# Patient Record
Sex: MALE | Race: WHITE | Employment: OTHER | URBAN - METROPOLITAN AREA
[De-identification: names, ages, dates, MRNs, and addresses within clinical notes are randomized per-mention and may not be internally consistent; named-entity substitution may affect disease eponyms.]

---

## 2017-09-12 ENCOUNTER — HOSPITAL ENCOUNTER (OUTPATIENT)
Dept: CT IMAGING | Age: 72
Discharge: HOME | End: 2017-09-12
Attending: SURGERY
Payer: MEDICARE

## 2017-09-12 DIAGNOSIS — I71.40 AAA (ABDOMINAL AORTIC ANEURYSM): ICD-10-CM

## 2017-09-12 LAB — CREAT BLD-MCNC: 1 MG/DL (ref 0.6–1.3)

## 2017-09-12 PROCEDURE — 74011250636 HC RX REV CODE- 250/636: Performed by: SURGERY

## 2017-09-12 PROCEDURE — 71275 CT ANGIOGRAPHY CHEST: CPT

## 2017-09-12 PROCEDURE — 74011636320 HC RX REV CODE- 636/320: Performed by: SURGERY

## 2017-09-12 PROCEDURE — 74174 CTA ABD&PLVS W/CONTRAST: CPT

## 2017-09-12 PROCEDURE — 82565 ASSAY OF CREATININE: CPT

## 2017-09-12 RX ORDER — SODIUM CHLORIDE 9 MG/ML
50 INJECTION, SOLUTION INTRAVENOUS
Status: COMPLETED | OUTPATIENT
Start: 2017-09-12 | End: 2017-09-12

## 2017-09-12 RX ORDER — SODIUM CHLORIDE 0.9 % (FLUSH) 0.9 %
10 SYRINGE (ML) INJECTION
Status: COMPLETED | OUTPATIENT
Start: 2017-09-12 | End: 2017-09-12

## 2017-09-12 RX ADMIN — IOPAMIDOL 100 ML: 755 INJECTION, SOLUTION INTRAVENOUS at 06:57

## 2017-09-12 RX ADMIN — Medication 10 ML: at 06:57

## 2017-09-12 RX ADMIN — SODIUM CHLORIDE 50 ML/HR: 900 INJECTION, SOLUTION INTRAVENOUS at 06:57

## 2017-09-25 ENCOUNTER — HOSPITAL ENCOUNTER (OUTPATIENT)
Dept: GENERAL RADIOLOGY | Age: 72
Discharge: HOME | End: 2017-09-25
Attending: SURGERY
Payer: MEDICARE

## 2017-09-25 ENCOUNTER — HOSPITAL ENCOUNTER (OUTPATIENT)
Dept: PREADMISSION TESTING | Age: 72
Discharge: HOME | End: 2017-09-25
Attending: SURGERY
Payer: MEDICARE

## 2017-09-25 VITALS
SYSTOLIC BLOOD PRESSURE: 126 MMHG | DIASTOLIC BLOOD PRESSURE: 72 MMHG | HEIGHT: 72 IN | WEIGHT: 171.3 LBS | TEMPERATURE: 98.5 F | BODY MASS INDEX: 23.2 KG/M2 | RESPIRATION RATE: 18 BRPM | OXYGEN SATURATION: 100 % | HEART RATE: 60 BPM

## 2017-09-25 LAB
ABO + RH BLD: NORMAL
ALBUMIN SERPL-MCNC: 3.5 G/DL (ref 3.5–5)
ALBUMIN/GLOB SERPL: 0.9 {RATIO} (ref 1.1–2.2)
ALP SERPL-CCNC: 69 U/L (ref 45–117)
ALT SERPL-CCNC: 19 U/L (ref 12–78)
ANION GAP SERPL CALC-SCNC: 7 MMOL/L (ref 5–15)
APPEARANCE UR: CLEAR
APTT PPP: 27.6 SEC (ref 22.1–32.5)
AST SERPL-CCNC: 9 U/L (ref 15–37)
ATRIAL RATE: 58 BPM
BACTERIA URNS QL MICRO: NEGATIVE /HPF
BILIRUB SERPL-MCNC: 0.4 MG/DL (ref 0.2–1)
BILIRUB UR QL: NEGATIVE
BLOOD GROUP ANTIBODIES SERPL: NORMAL
BUN SERPL-MCNC: 20 MG/DL (ref 6–20)
BUN/CREAT SERPL: 17 (ref 12–20)
CALCIUM SERPL-MCNC: 8.9 MG/DL (ref 8.5–10.1)
CALCULATED P AXIS, ECG09: 72 DEGREES
CALCULATED R AXIS, ECG10: 82 DEGREES
CALCULATED T AXIS, ECG11: 75 DEGREES
CHLORIDE SERPL-SCNC: 104 MMOL/L (ref 97–108)
CO2 SERPL-SCNC: 28 MMOL/L (ref 21–32)
COLOR UR: ABNORMAL
CREAT SERPL-MCNC: 1.16 MG/DL (ref 0.7–1.3)
DIAGNOSIS, 93000: NORMAL
EPITH CASTS URNS QL MICRO: ABNORMAL /LPF
ERYTHROCYTE [DISTWIDTH] IN BLOOD BY AUTOMATED COUNT: 14.4 % (ref 11.5–14.5)
GLOBULIN SER CALC-MCNC: 3.8 G/DL (ref 2–4)
GLUCOSE SERPL-MCNC: 81 MG/DL (ref 65–100)
GLUCOSE UR STRIP.AUTO-MCNC: NEGATIVE MG/DL
HCT VFR BLD AUTO: 44.3 % (ref 36.6–50.3)
HGB BLD-MCNC: 15.4 G/DL (ref 12.1–17)
HGB UR QL STRIP: ABNORMAL
HYALINE CASTS URNS QL MICRO: ABNORMAL /LPF (ref 0–5)
INR PPP: 1 (ref 0.9–1.1)
KETONES UR QL STRIP.AUTO: NEGATIVE MG/DL
LEUKOCYTE ESTERASE UR QL STRIP.AUTO: ABNORMAL
MCH RBC QN AUTO: 30.9 PG (ref 26–34)
MCHC RBC AUTO-ENTMCNC: 34.8 G/DL (ref 30–36.5)
MCV RBC AUTO: 88.8 FL (ref 80–99)
NITRITE UR QL STRIP.AUTO: NEGATIVE
P-R INTERVAL, ECG05: 178 MS
PH UR STRIP: 6 [PH] (ref 5–8)
PLATELET # BLD AUTO: 216 K/UL (ref 150–400)
POTASSIUM SERPL-SCNC: 4.3 MMOL/L (ref 3.5–5.1)
PROT SERPL-MCNC: 7.3 G/DL (ref 6.4–8.2)
PROT UR STRIP-MCNC: 30 MG/DL
PROTHROMBIN TIME: 10.1 SEC (ref 9–11.1)
Q-T INTERVAL, ECG07: 396 MS
QRS DURATION, ECG06: 96 MS
QTC CALCULATION (BEZET), ECG08: 388 MS
RBC # BLD AUTO: 4.99 M/UL (ref 4.1–5.7)
RBC #/AREA URNS HPF: ABNORMAL /HPF (ref 0–5)
SODIUM SERPL-SCNC: 139 MMOL/L (ref 136–145)
SP GR UR REFRACTOMETRY: 1.02 (ref 1–1.03)
SPECIMEN EXP DATE BLD: NORMAL
THERAPEUTIC RANGE,PTTT: NORMAL SECS (ref 58–77)
UA: UC IF INDICATED,UAUC: ABNORMAL
UROBILINOGEN UR QL STRIP.AUTO: 0.2 EU/DL (ref 0.2–1)
VENTRICULAR RATE, ECG03: 58 BPM
WBC # BLD AUTO: 11.9 K/UL (ref 4.1–11.1)
WBC URNS QL MICRO: ABNORMAL /HPF (ref 0–4)

## 2017-09-25 PROCEDURE — 87086 URINE CULTURE/COLONY COUNT: CPT | Performed by: SURGERY

## 2017-09-25 PROCEDURE — 85730 THROMBOPLASTIN TIME PARTIAL: CPT | Performed by: SURGERY

## 2017-09-25 PROCEDURE — 81001 URINALYSIS AUTO W/SCOPE: CPT | Performed by: SURGERY

## 2017-09-25 PROCEDURE — 71020 XR CHEST PA LAT: CPT

## 2017-09-25 PROCEDURE — 80053 COMPREHEN METABOLIC PANEL: CPT | Performed by: SURGERY

## 2017-09-25 PROCEDURE — 93005 ELECTROCARDIOGRAM TRACING: CPT

## 2017-09-25 PROCEDURE — 85027 COMPLETE CBC AUTOMATED: CPT | Performed by: SURGERY

## 2017-09-25 PROCEDURE — 86900 BLOOD TYPING SEROLOGIC ABO: CPT | Performed by: SURGERY

## 2017-09-25 PROCEDURE — 85610 PROTHROMBIN TIME: CPT | Performed by: SURGERY

## 2017-09-25 RX ORDER — GUAIFENESIN 100 MG/5ML
81 LIQUID (ML) ORAL DAILY
COMMUNITY

## 2017-09-25 RX ORDER — LISINOPRIL 20 MG/1
20 TABLET ORAL DAILY
COMMUNITY

## 2017-09-25 RX ORDER — CLOPIDOGREL BISULFATE 75 MG/1
75 TABLET ORAL
COMMUNITY

## 2017-09-25 RX ORDER — GLUCOSAMINE SULFATE 1500 MG
1000 POWDER IN PACKET (EA) ORAL DAILY
COMMUNITY

## 2017-09-26 LAB
BACTERIA SPEC CULT: NORMAL
CC UR VC: NORMAL
SERVICE CMNT-IMP: NORMAL

## 2017-09-29 ENCOUNTER — ANESTHESIA (OUTPATIENT)
Dept: SURGERY | Age: 72
DRG: 269 | End: 2017-09-29
Payer: MEDICARE

## 2017-09-29 ENCOUNTER — HOSPITAL ENCOUNTER (INPATIENT)
Age: 72
LOS: 2 days | Discharge: HOME | DRG: 269 | End: 2017-10-01
Attending: SURGERY | Admitting: SURGERY
Payer: MEDICARE

## 2017-09-29 ENCOUNTER — ANESTHESIA EVENT (OUTPATIENT)
Dept: SURGERY | Age: 72
DRG: 269 | End: 2017-09-29
Payer: MEDICARE

## 2017-09-29 ENCOUNTER — APPOINTMENT (OUTPATIENT)
Dept: GENERAL RADIOLOGY | Age: 72
DRG: 269 | End: 2017-09-29
Attending: SURGERY
Payer: MEDICARE

## 2017-09-29 PROBLEM — I71.40 AAA (ABDOMINAL AORTIC ANEURYSM) WITHOUT RUPTURE: Status: ACTIVE | Noted: 2017-09-29

## 2017-09-29 PROCEDURE — 74011000258 HC RX REV CODE- 258: Performed by: SURGERY

## 2017-09-29 PROCEDURE — 77030034848: Performed by: SURGERY

## 2017-09-29 PROCEDURE — 74011000272 HC RX REV CODE- 272: Performed by: SURGERY

## 2017-09-29 PROCEDURE — 76001 XR FLUOROSCOPY OVER 60 MINUTES: CPT

## 2017-09-29 PROCEDURE — 74011636320 HC RX REV CODE- 636/320: Performed by: SURGERY

## 2017-09-29 PROCEDURE — 74011250636 HC RX REV CODE- 250/636: Performed by: SURGERY

## 2017-09-29 PROCEDURE — 77030008684 HC TU ET CUF COVD -B: Performed by: NURSE ANESTHETIST, CERTIFIED REGISTERED

## 2017-09-29 PROCEDURE — 76010000133 HC OR TIME 3 TO 3.5 HR: Performed by: SURGERY

## 2017-09-29 PROCEDURE — 74011250636 HC RX REV CODE- 250/636

## 2017-09-29 PROCEDURE — 77030032490 HC SLV COMPR SCD KNE COVD -B

## 2017-09-29 PROCEDURE — 77030026438 HC STYL ET INTUB CARD -A: Performed by: NURSE ANESTHETIST, CERTIFIED REGISTERED

## 2017-09-29 PROCEDURE — 76210000017 HC OR PH I REC 1.5 TO 2 HR: Performed by: SURGERY

## 2017-09-29 PROCEDURE — 74011250637 HC RX REV CODE- 250/637

## 2017-09-29 PROCEDURE — 77030036834 HC APPL HELI-FX W ENDOANCHR CASS MEDT -J: Performed by: SURGERY

## 2017-09-29 PROCEDURE — 77030002986 HC SUT PROL J&J -A: Performed by: SURGERY

## 2017-09-29 PROCEDURE — 04V03D6 RESTRICT ABD AORTA, BIFURC, W INTRALUM DEV, PERC: ICD-10-PCS | Performed by: SURGERY

## 2017-09-29 PROCEDURE — C1894 INTRO/SHEATH, NON-LASER: HCPCS | Performed by: SURGERY

## 2017-09-29 PROCEDURE — 77030020061 HC IV BLD WRMR ADMIN SET 3M -B: Performed by: NURSE ANESTHETIST, CERTIFIED REGISTERED

## 2017-09-29 PROCEDURE — 65610000006 HC RM INTENSIVE CARE

## 2017-09-29 PROCEDURE — C1757 CATH, THROMBECTOMY/EMBOLECT: HCPCS | Performed by: SURGERY

## 2017-09-29 PROCEDURE — 76010000173 HC OR TIME 3 TO 3.5 HR INTENSV-TIER 1: Performed by: SURGERY

## 2017-09-29 PROCEDURE — 72190 X-RAY EXAM OF PELVIS: CPT

## 2017-09-29 PROCEDURE — C1753 CATH, INTRAVAS ULTRASOUND: HCPCS | Performed by: SURGERY

## 2017-09-29 PROCEDURE — C1769 GUIDE WIRE: HCPCS | Performed by: SURGERY

## 2017-09-29 PROCEDURE — 74011000250 HC RX REV CODE- 250: Performed by: SURGERY

## 2017-09-29 PROCEDURE — C1887 CATHETER, GUIDING: HCPCS | Performed by: SURGERY

## 2017-09-29 PROCEDURE — 77030011640 HC PAD GRND REM COVD -A: Performed by: SURGERY

## 2017-09-29 PROCEDURE — 74011250636 HC RX REV CODE- 250/636: Performed by: ANESTHESIOLOGY

## 2017-09-29 PROCEDURE — 74011000250 HC RX REV CODE- 250

## 2017-09-29 PROCEDURE — 76060000037 HC ANESTHESIA 3 TO 3.5 HR: Performed by: SURGERY

## 2017-09-29 PROCEDURE — 77030036835 HC GD AAA REP HELI-FX MEDT -H: Performed by: SURGERY

## 2017-09-29 PROCEDURE — C1768 GRAFT, VASCULAR: HCPCS | Performed by: SURGERY

## 2017-09-29 PROCEDURE — B4101ZZ FLUOROSCOPY OF ABDOMINAL AORTA USING LOW OSMOLAR CONTRAST: ICD-10-PCS | Performed by: SURGERY

## 2017-09-29 PROCEDURE — 77030020788: Performed by: SURGERY

## 2017-09-29 PROCEDURE — 77030028837 HC SYR ANGI PWR INJ COEU -A: Performed by: SURGERY

## 2017-09-29 PROCEDURE — 74011250637 HC RX REV CODE- 250/637: Performed by: SURGERY

## 2017-09-29 PROCEDURE — 77030008463 HC STPLR SKN PROX J&J -B: Performed by: SURGERY

## 2017-09-29 PROCEDURE — 04UD3JZ SUPPLEMENT LEFT COMMON ILIAC ARTERY WITH SYNTHETIC SUBSTITUTE, PERCUTANEOUS APPROACH: ICD-10-PCS | Performed by: SURGERY

## 2017-09-29 PROCEDURE — 04U04JZ SUPPLEMENT ABDOMINAL AORTA WITH SYNTHETIC SUBSTITUTE, PERCUTANEOUS ENDOSCOPIC APPROACH: ICD-10-PCS | Performed by: SURGERY

## 2017-09-29 PROCEDURE — 77030018846 HC SOL IRR STRL H20 ICUM -A: Performed by: SURGERY

## 2017-09-29 PROCEDURE — 04UC3JZ SUPPLEMENT RIGHT COMMON ILIAC ARTERY WITH SYNTHETIC SUBSTITUTE, PERCUTANEOUS APPROACH: ICD-10-PCS | Performed by: SURGERY

## 2017-09-29 PROCEDURE — C1725 CATH, TRANSLUMIN NON-LASER: HCPCS | Performed by: SURGERY

## 2017-09-29 PROCEDURE — B41D1ZZ FLUOROSCOPY OF AORTA AND BILATERAL LOWER EXTREMITY ARTERIES USING LOW OSMOLAR CONTRAST: ICD-10-PCS | Performed by: SURGERY

## 2017-09-29 PROCEDURE — 77030020263 HC SOL INJ SOD CL0.9% LFCR 1000ML: Performed by: SURGERY

## 2017-09-29 DEVICE — APPLIER CLP 12FR L86CM W/ ENDOANCHOR CASS FOR EVAR HELI-FX: Type: IMPLANTABLE DEVICE | Site: AORTA | Status: FUNCTIONAL

## 2017-09-29 DEVICE — GRAFT EVAR L103MM DIA28X14MM CATH 18FR BIFUR DST DSGN C DEL: Type: IMPLANTABLE DEVICE | Site: AORTA | Status: FUNCTIONAL

## 2017-09-29 DEVICE — GRAFT EVAR L124MM DIA16X13MM CATH 14FR LIMB DST DSGN C DEL: Type: IMPLANTABLE DEVICE | Site: ARTERIAL | Status: FUNCTIONAL

## 2017-09-29 RX ORDER — LIDOCAINE HYDROCHLORIDE 20 MG/ML
INJECTION, SOLUTION EPIDURAL; INFILTRATION; INTRACAUDAL; PERINEURAL AS NEEDED
Status: DISCONTINUED | OUTPATIENT
Start: 2017-09-29 | End: 2017-09-29 | Stop reason: HOSPADM

## 2017-09-29 RX ORDER — SODIUM CHLORIDE 9 MG/ML
50 INJECTION, SOLUTION INTRAVENOUS CONTINUOUS
Status: DISCONTINUED | OUTPATIENT
Start: 2017-09-29 | End: 2017-10-01

## 2017-09-29 RX ORDER — ONDANSETRON 2 MG/ML
4 INJECTION INTRAMUSCULAR; INTRAVENOUS AS NEEDED
Status: DISCONTINUED | OUTPATIENT
Start: 2017-09-29 | End: 2017-09-29 | Stop reason: HOSPADM

## 2017-09-29 RX ORDER — GLYCOPYRROLATE 0.2 MG/ML
INJECTION INTRAMUSCULAR; INTRAVENOUS AS NEEDED
Status: DISCONTINUED | OUTPATIENT
Start: 2017-09-29 | End: 2017-09-29 | Stop reason: HOSPADM

## 2017-09-29 RX ORDER — GUAIFENESIN 100 MG/5ML
81 LIQUID (ML) ORAL DAILY
Status: DISCONTINUED | OUTPATIENT
Start: 2017-09-30 | End: 2017-10-01 | Stop reason: HOSPADM

## 2017-09-29 RX ORDER — MORPHINE SULFATE 2 MG/ML
2 INJECTION, SOLUTION INTRAMUSCULAR; INTRAVENOUS
Status: DISCONTINUED | OUTPATIENT
Start: 2017-09-29 | End: 2017-10-01

## 2017-09-29 RX ORDER — SODIUM CHLORIDE 0.9 % (FLUSH) 0.9 %
SYRINGE (ML) INJECTION
Status: COMPLETED
Start: 2017-09-29 | End: 2017-09-29

## 2017-09-29 RX ORDER — SODIUM CHLORIDE, SODIUM LACTATE, POTASSIUM CHLORIDE, CALCIUM CHLORIDE 600; 310; 30; 20 MG/100ML; MG/100ML; MG/100ML; MG/100ML
100 INJECTION, SOLUTION INTRAVENOUS CONTINUOUS
Status: DISCONTINUED | OUTPATIENT
Start: 2017-09-29 | End: 2017-09-29 | Stop reason: HOSPADM

## 2017-09-29 RX ORDER — LIDOCAINE HYDROCHLORIDE 10 MG/ML
0.1 INJECTION, SOLUTION EPIDURAL; INFILTRATION; INTRACAUDAL; PERINEURAL AS NEEDED
Status: DISCONTINUED | OUTPATIENT
Start: 2017-09-29 | End: 2017-09-29 | Stop reason: HOSPADM

## 2017-09-29 RX ORDER — SUCCINYLCHOLINE CHLORIDE 20 MG/ML
INJECTION INTRAMUSCULAR; INTRAVENOUS AS NEEDED
Status: DISCONTINUED | OUTPATIENT
Start: 2017-09-29 | End: 2017-09-29 | Stop reason: HOSPADM

## 2017-09-29 RX ORDER — CLOPIDOGREL BISULFATE 75 MG/1
75 TABLET ORAL DAILY
Status: DISCONTINUED | OUTPATIENT
Start: 2017-09-30 | End: 2017-10-01 | Stop reason: HOSPADM

## 2017-09-29 RX ORDER — ALBUTEROL SULFATE 90 UG/1
AEROSOL, METERED RESPIRATORY (INHALATION) AS NEEDED
Status: DISCONTINUED | OUTPATIENT
Start: 2017-09-29 | End: 2017-09-29 | Stop reason: HOSPADM

## 2017-09-29 RX ORDER — MIDAZOLAM HYDROCHLORIDE 1 MG/ML
INJECTION, SOLUTION INTRAMUSCULAR; INTRAVENOUS AS NEEDED
Status: DISCONTINUED | OUTPATIENT
Start: 2017-09-29 | End: 2017-09-29 | Stop reason: HOSPADM

## 2017-09-29 RX ORDER — ROCURONIUM BROMIDE 10 MG/ML
INJECTION, SOLUTION INTRAVENOUS AS NEEDED
Status: DISCONTINUED | OUTPATIENT
Start: 2017-09-29 | End: 2017-09-29 | Stop reason: HOSPADM

## 2017-09-29 RX ORDER — DIPHENHYDRAMINE HYDROCHLORIDE 50 MG/ML
12.5 INJECTION, SOLUTION INTRAMUSCULAR; INTRAVENOUS AS NEEDED
Status: DISCONTINUED | OUTPATIENT
Start: 2017-09-29 | End: 2017-09-29 | Stop reason: HOSPADM

## 2017-09-29 RX ORDER — FACIAL-BODY WIPES
10 EACH TOPICAL DAILY PRN
Status: DISCONTINUED | OUTPATIENT
Start: 2017-09-29 | End: 2017-10-01 | Stop reason: HOSPADM

## 2017-09-29 RX ORDER — MORPHINE SULFATE 10 MG/ML
4 INJECTION, SOLUTION INTRAMUSCULAR; INTRAVENOUS
Status: DISCONTINUED | OUTPATIENT
Start: 2017-09-29 | End: 2017-10-01

## 2017-09-29 RX ORDER — ONDANSETRON 2 MG/ML
INJECTION INTRAMUSCULAR; INTRAVENOUS AS NEEDED
Status: DISCONTINUED | OUTPATIENT
Start: 2017-09-29 | End: 2017-09-29 | Stop reason: HOSPADM

## 2017-09-29 RX ORDER — LISINOPRIL 20 MG/1
20 TABLET ORAL DAILY
Status: DISCONTINUED | OUTPATIENT
Start: 2017-09-30 | End: 2017-09-29

## 2017-09-29 RX ORDER — HYDROMORPHONE HYDROCHLORIDE 1 MG/ML
0.5 INJECTION, SOLUTION INTRAMUSCULAR; INTRAVENOUS; SUBCUTANEOUS
Status: DISCONTINUED | OUTPATIENT
Start: 2017-09-29 | End: 2017-09-29 | Stop reason: HOSPADM

## 2017-09-29 RX ORDER — PHENYLEPHRINE HCL IN 0.9% NACL 0.4MG/10ML
SYRINGE (ML) INTRAVENOUS AS NEEDED
Status: DISCONTINUED | OUTPATIENT
Start: 2017-09-29 | End: 2017-09-29 | Stop reason: HOSPADM

## 2017-09-29 RX ORDER — FENTANYL CITRATE 50 UG/ML
INJECTION, SOLUTION INTRAMUSCULAR; INTRAVENOUS AS NEEDED
Status: DISCONTINUED | OUTPATIENT
Start: 2017-09-29 | End: 2017-09-29 | Stop reason: HOSPADM

## 2017-09-29 RX ORDER — HYDROCODONE BITARTRATE AND ACETAMINOPHEN 5; 325 MG/1; MG/1
1 TABLET ORAL AS NEEDED
Status: DISCONTINUED | OUTPATIENT
Start: 2017-09-29 | End: 2017-09-29 | Stop reason: HOSPADM

## 2017-09-29 RX ORDER — FENTANYL CITRATE 50 UG/ML
25 INJECTION, SOLUTION INTRAMUSCULAR; INTRAVENOUS
Status: DISCONTINUED | OUTPATIENT
Start: 2017-09-29 | End: 2017-09-29 | Stop reason: HOSPADM

## 2017-09-29 RX ORDER — ACETAMINOPHEN 10 MG/ML
INJECTION, SOLUTION INTRAVENOUS AS NEEDED
Status: DISCONTINUED | OUTPATIENT
Start: 2017-09-29 | End: 2017-09-29 | Stop reason: HOSPADM

## 2017-09-29 RX ORDER — MIDAZOLAM HYDROCHLORIDE 1 MG/ML
1 INJECTION, SOLUTION INTRAMUSCULAR; INTRAVENOUS AS NEEDED
Status: DISCONTINUED | OUTPATIENT
Start: 2017-09-29 | End: 2017-09-29 | Stop reason: HOSPADM

## 2017-09-29 RX ORDER — NEOSTIGMINE METHYLSULFATE 1 MG/ML
INJECTION INTRAVENOUS AS NEEDED
Status: DISCONTINUED | OUTPATIENT
Start: 2017-09-29 | End: 2017-09-29 | Stop reason: HOSPADM

## 2017-09-29 RX ORDER — FENTANYL CITRATE 50 UG/ML
50 INJECTION, SOLUTION INTRAMUSCULAR; INTRAVENOUS AS NEEDED
Status: DISCONTINUED | OUTPATIENT
Start: 2017-09-29 | End: 2017-09-29 | Stop reason: HOSPADM

## 2017-09-29 RX ORDER — OXYCODONE AND ACETAMINOPHEN 5; 325 MG/1; MG/1
1-2 TABLET ORAL
Status: DISCONTINUED | OUTPATIENT
Start: 2017-09-29 | End: 2017-10-01 | Stop reason: HOSPADM

## 2017-09-29 RX ORDER — HEPARIN SODIUM 1000 [USP'U]/ML
INJECTION, SOLUTION INTRAVENOUS; SUBCUTANEOUS AS NEEDED
Status: DISCONTINUED | OUTPATIENT
Start: 2017-09-29 | End: 2017-09-29 | Stop reason: HOSPADM

## 2017-09-29 RX ORDER — ADHESIVE BANDAGE
30 BANDAGE TOPICAL DAILY PRN
Status: DISCONTINUED | OUTPATIENT
Start: 2017-09-29 | End: 2017-10-01 | Stop reason: HOSPADM

## 2017-09-29 RX ORDER — MIDAZOLAM HYDROCHLORIDE 1 MG/ML
0.5 INJECTION, SOLUTION INTRAMUSCULAR; INTRAVENOUS
Status: DISCONTINUED | OUTPATIENT
Start: 2017-09-29 | End: 2017-09-29 | Stop reason: HOSPADM

## 2017-09-29 RX ORDER — SODIUM CHLORIDE, SODIUM LACTATE, POTASSIUM CHLORIDE, CALCIUM CHLORIDE 600; 310; 30; 20 MG/100ML; MG/100ML; MG/100ML; MG/100ML
25 INJECTION, SOLUTION INTRAVENOUS CONTINUOUS
Status: DISCONTINUED | OUTPATIENT
Start: 2017-09-29 | End: 2017-09-30

## 2017-09-29 RX ORDER — LISINOPRIL 20 MG/1
20 TABLET ORAL DAILY
Status: DISCONTINUED | OUTPATIENT
Start: 2017-09-29 | End: 2017-10-01 | Stop reason: HOSPADM

## 2017-09-29 RX ORDER — ONDANSETRON 2 MG/ML
4 INJECTION INTRAMUSCULAR; INTRAVENOUS
Status: DISCONTINUED | OUTPATIENT
Start: 2017-09-29 | End: 2017-10-01 | Stop reason: HOSPADM

## 2017-09-29 RX ORDER — PROPOFOL 10 MG/ML
INJECTION, EMULSION INTRAVENOUS AS NEEDED
Status: DISCONTINUED | OUTPATIENT
Start: 2017-09-29 | End: 2017-09-29 | Stop reason: HOSPADM

## 2017-09-29 RX ORDER — CEFAZOLIN SODIUM IN 0.9 % NACL 2 G/100 ML
2 PLASTIC BAG, INJECTION (ML) INTRAVENOUS EVERY 8 HOURS
Status: COMPLETED | OUTPATIENT
Start: 2017-09-29 | End: 2017-09-30

## 2017-09-29 RX ADMIN — Medication 3 MG/HR: at 21:21

## 2017-09-29 RX ADMIN — SODIUM CHLORIDE, SODIUM LACTATE, POTASSIUM CHLORIDE, AND CALCIUM CHLORIDE 25 ML/HR: 600; 310; 30; 20 INJECTION, SOLUTION INTRAVENOUS at 07:05

## 2017-09-29 RX ADMIN — FENTANYL CITRATE 25 MCG: 50 INJECTION, SOLUTION INTRAMUSCULAR; INTRAVENOUS at 09:30

## 2017-09-29 RX ADMIN — FENTANYL CITRATE 25 MCG: 50 INJECTION, SOLUTION INTRAMUSCULAR; INTRAVENOUS at 09:45

## 2017-09-29 RX ADMIN — ROCURONIUM BROMIDE 10 MG: 10 INJECTION, SOLUTION INTRAVENOUS at 08:10

## 2017-09-29 RX ADMIN — MIDAZOLAM HYDROCHLORIDE 1 MG: 1 INJECTION, SOLUTION INTRAMUSCULAR; INTRAVENOUS at 07:02

## 2017-09-29 RX ADMIN — ACETAMINOPHEN 1000 MG: 10 INJECTION, SOLUTION INTRAVENOUS at 09:59

## 2017-09-29 RX ADMIN — HEPARIN SODIUM 2000 UNITS: 1000 INJECTION, SOLUTION INTRAVENOUS; SUBCUTANEOUS at 09:26

## 2017-09-29 RX ADMIN — LISINOPRIL 20 MG: 20 TABLET ORAL at 14:33

## 2017-09-29 RX ADMIN — LIDOCAINE HYDROCHLORIDE 40 MG: 20 INJECTION, SOLUTION EPIDURAL; INFILTRATION; INTRACAUDAL; PERINEURAL at 10:39

## 2017-09-29 RX ADMIN — ONDANSETRON 4 MG: 2 INJECTION INTRAMUSCULAR; INTRAVENOUS at 10:02

## 2017-09-29 RX ADMIN — HEPARIN SODIUM 6000 UNITS: 1000 INJECTION, SOLUTION INTRAVENOUS; SUBCUTANEOUS at 08:29

## 2017-09-29 RX ADMIN — FENTANYL CITRATE 25 MCG: 50 INJECTION, SOLUTION INTRAMUSCULAR; INTRAVENOUS at 08:32

## 2017-09-29 RX ADMIN — Medication 10 ML: at 17:02

## 2017-09-29 RX ADMIN — Medication 2 MG/HR: at 11:19

## 2017-09-29 RX ADMIN — ROCURONIUM BROMIDE 10 MG: 10 INJECTION, SOLUTION INTRAVENOUS at 09:02

## 2017-09-29 RX ADMIN — SUCCINYLCHOLINE CHLORIDE 50 MG: 20 INJECTION INTRAMUSCULAR; INTRAVENOUS at 07:54

## 2017-09-29 RX ADMIN — MORPHINE SULFATE 2 MG: 2 INJECTION, SOLUTION INTRAMUSCULAR; INTRAVENOUS at 14:33

## 2017-09-29 RX ADMIN — CEFAZOLIN 2 G: 10 INJECTION, POWDER, FOR SOLUTION INTRAVENOUS; PARENTERAL at 17:11

## 2017-09-29 RX ADMIN — SODIUM CHLORIDE 50 ML/HR: 900 INJECTION, SOLUTION INTRAVENOUS at 11:19

## 2017-09-29 RX ADMIN — ONDANSETRON 4 MG: 2 INJECTION INTRAMUSCULAR; INTRAVENOUS at 17:02

## 2017-09-29 RX ADMIN — Medication 120 MCG: at 07:38

## 2017-09-29 RX ADMIN — ROCURONIUM BROMIDE 15 MG: 10 INJECTION, SOLUTION INTRAVENOUS at 07:38

## 2017-09-29 RX ADMIN — LIDOCAINE HYDROCHLORIDE 60 MG: 20 INJECTION, SOLUTION EPIDURAL; INFILTRATION; INTRACAUDAL; PERINEURAL at 07:34

## 2017-09-29 RX ADMIN — SUCCINYLCHOLINE CHLORIDE 160 MG: 20 INJECTION INTRAMUSCULAR; INTRAVENOUS at 07:34

## 2017-09-29 RX ADMIN — CEFAZOLIN 2 G: 1 INJECTION, POWDER, FOR SOLUTION INTRAMUSCULAR; INTRAVENOUS; PARENTERAL at 07:42

## 2017-09-29 RX ADMIN — GLYCOPYRROLATE 0.5 MG: 0.2 INJECTION INTRAMUSCULAR; INTRAVENOUS at 10:34

## 2017-09-29 RX ADMIN — PROPOFOL 50 MG: 10 INJECTION, EMULSION INTRAVENOUS at 10:35

## 2017-09-29 RX ADMIN — FENTANYL CITRATE 50 MCG: 50 INJECTION, SOLUTION INTRAMUSCULAR; INTRAVENOUS at 07:02

## 2017-09-29 RX ADMIN — NEOSTIGMINE METHYLSULFATE 3 MG: 1 INJECTION INTRAVENOUS at 10:34

## 2017-09-29 RX ADMIN — Medication 120 MCG: at 07:37

## 2017-09-29 RX ADMIN — MORPHINE SULFATE 2 MG: 2 INJECTION, SOLUTION INTRAMUSCULAR; INTRAVENOUS at 17:02

## 2017-09-29 RX ADMIN — ROCURONIUM BROMIDE 10 MG: 10 INJECTION, SOLUTION INTRAVENOUS at 08:54

## 2017-09-29 RX ADMIN — ALBUTEROL SULFATE 3 PUFF: 90 AEROSOL, METERED RESPIRATORY (INHALATION) at 08:27

## 2017-09-29 RX ADMIN — Medication 120 MCG: at 07:36

## 2017-09-29 RX ADMIN — ROCURONIUM BROMIDE 5 MG: 10 INJECTION, SOLUTION INTRAVENOUS at 07:34

## 2017-09-29 RX ADMIN — FENTANYL CITRATE 25 MCG: 50 INJECTION, SOLUTION INTRAMUSCULAR; INTRAVENOUS at 09:02

## 2017-09-29 RX ADMIN — MORPHINE SULFATE 2 MG: 2 INJECTION, SOLUTION INTRAMUSCULAR; INTRAVENOUS at 21:58

## 2017-09-29 RX ADMIN — FENTANYL CITRATE 50 MCG: 50 INJECTION, SOLUTION INTRAMUSCULAR; INTRAVENOUS at 07:34

## 2017-09-29 RX ADMIN — FENTANYL CITRATE 25 MCG: 50 INJECTION, SOLUTION INTRAMUSCULAR; INTRAVENOUS at 10:41

## 2017-09-29 RX ADMIN — ROCURONIUM BROMIDE 10 MG: 10 INJECTION, SOLUTION INTRAVENOUS at 09:52

## 2017-09-29 RX ADMIN — ROCURONIUM BROMIDE 10 MG: 10 INJECTION, SOLUTION INTRAVENOUS at 07:43

## 2017-09-29 RX ADMIN — ROCURONIUM BROMIDE 20 MG: 10 INJECTION, SOLUTION INTRAVENOUS at 07:55

## 2017-09-29 RX ADMIN — PROPOFOL 150 MG: 10 INJECTION, EMULSION INTRAVENOUS at 07:34

## 2017-09-29 NOTE — BRIEF OP NOTE
BRIEF OPERATIVE NOTE    Date of Procedure: 9/29/2017   Preoperative Diagnosis: AAA  Postoperative Diagnosis: AAA    Procedure(s):  ENDOVASCULAR AORTIC ABDOMINAL ANEURYSM REPAIR(EVAR)   Surgeon(s) and Role:     * David Yoder MD - Primary         Assistant Staff:       Surgical Staff:  Circ-1: Ankur Hallman RN  Circ-2: Charlotte Wu  Radiology Technician: RT Raffi  Scrub Tech-1: Rhae Lapping  Scrub Tech-2: Genie Knoxch  Surg Asst-1: Sujey Ou  Event Time In   Incision Start 9441   Incision Close 1041     Anesthesia: General   Estimated Blood Loss: 100 cc  Specimens: * No specimens in log *   Findings: Uncomplicated EVAR w/ Aptus   Complications: None  Implants:   Implant Name Type Inv.  Item Serial No.  Lot No. LRB No. Used Action   GRAFT STNT BIFUR [id number] -- ENDURANT II - [id number] Stent GRAFT STNT BIFUR [id number] -- ENDURANT II [id number] [employer] N/A N/A 1 Implanted   GRAFT STNT CONTRA [id number] -- ENDURANT II - [id number] Stent GRAFT STNT CONTRA [id number] -- ENDURANT II [id number] [employer] N/A Left 1 Implanted   ENDURANT II STENT GRAFT SYSTEM LIMB 16MM X 93MM   [id number] [employer] N/A Right 1 Implanted   ANCHOR ENDO W/APPL 10EA SINGH -- LISETH-FX APTUS - SN/A   ANCHOR ENDO W/APPL 10EA SINGH -- LISETH-FX APTUS N/A [employer] [id number] N/A 1 Implanted

## 2017-09-29 NOTE — PROGRESS NOTES
[facility]  Pulmonary, Critical Care, and Sleep Medicine    Name: Quinten Jones MRN: [id number]   : 1945 Hospital: [facility]   Date: 2017        Critical Care Initial Patient Consult    IMPRESSION:   · S/p EVAR  For AAA per Dr. Shonda Benavides on 17. · Left Upper Lobe 1.5cm x 0.7cm nodule  · Smoker  · Hypertension  · CAD  · PAD  · Arthritis  · Daily etoh use, 2-3 beers per day. RECOMMENDATIONS:   · BP control  · Monitor for bleeding  · Monitor for DTs  · Will need outpt pulmonary follow up for re-evaluation of the Pulmonary nodule  · Can call [phone] for evaluation. Subjective/History: This patient has been seen and evaluated at the request of Dr. Shonda Benavides for above. Patient is a 66 y.o. male who is seen in the ICU for above. He has mild groin pain and mild nausea. ROS is otherwise negative. Past Medical History:   Diagnosis Date    Aneurysm ([facility] [address]     aortic aneurysm     Arthritis     CAD (coronary artery disease)     Hypertension     Ill-defined condition 2008    PVD    Thromboembolus ([facility] [address]       Past Surgical History:   Procedure Laterality Date    CARDIAC SURG PROCEDURE UNLIST  2008    stent- prox RCA     HX APPENDECTOMY      HX ORTHOPAEDIC      right ankle broken- screw and pins     HX ROTATOR CUFF REPAIR      left shoulder     HX VASCULAR ACCESS      bypass done x3       Prior to Admission medications    Medication Sig Start Date End Date Taking? Authorizing Provider   aspirin 81 mg chewable tablet Take 81 mg by mouth daily. Historical Provider   cholecalciferol (VITAMIN D3) 1,000 unit cap Take 1,000 Units by mouth daily. Historical Provider   lisinopril (PRINIVIL, ZESTRIL) 20 mg tablet Take 20 mg by mouth daily. Historical Provider   clopidogrel (PLAVIX) 75 mg tab Take 75 mg by mouth.     Historical Provider     Current Facility-Administered Medications   Medication Dose Route Frequency    lactated Ringers infusion  25 mL/hr IntraVENous CONTINUOUS    [START ON 2017] aspirin chewable tablet 81 mg  81 mg Oral DAILY    [START ON 2017] clopidogrel (PLAVIX) tablet 75 mg  75 mg Oral DAILY    0.9% sodium chloride infusion  50 mL/hr IntraVENous CONTINUOUS    ceFAZolin (ANCEF) 2g in 100 ml 0.9% NS IVPB  2 g IntraVENous Q8H    niCARdipine (CARDENE) 25 mg in 0.9% sodium chloride 250 mL infusion  0-15 mg/hr IntraVENous TITRATE    PHENYLephrine (NEOSYNEPHRINE) 10,000 mcg in 0.9% sodium chloride 250 mL infusion   mcg/min IntraVENous TITRATE    lisinopril (PRINIVIL, ZESTRIL) tablet 20 mg  20 mg Oral DAILY     No Known Allergies   Social History   Substance Use Topics    Smoking status: Current Every Day Smoker     Packs/day: 0.50    Smokeless tobacco: Never Used    Alcohol use 6.6 oz/week     5 Cans of beer, 6 Shots of liquor per week      Comment: drinks mix drink 1/night  and a few beers on weekends       History reviewed. No pertinent family history. Review of Systems:  A comprehensive review of systems was negative. Objective:   Vital Signs:    Visit Vitals    /69    Pulse 63    Temp 97.6 °F (36.4 °C)    Resp 11    Ht 6' (1.829 m)    Wt 77.1 kg (169 lb 15.6 oz)    SpO2 97%    BMI 23.05 kg/m2       O2 Device: Nasal cannula   O2 Flow Rate (L/min): 3 l/min   Temp (24hrs), Av.7 °F (36.5 °C), Min:97.6 °F (36.4 °C), Max:97.8 °F (36.6 °C)       Intake/Output:   Last shift:       0701 -  1900  In: 1750 [I.V.:1750]  Out: 400 [Urine:150]  Last 3 shifts:      Intake/Output Summary (Last 24 hours) at 17 1406  Last data filed at 17 1045   Gross per 24 hour   Intake             1750 ml   Output              400 ml   Net             1350 ml     Physical Exam:    General:  Alert, cooperative, no distress, appears stated age. Head:  Normocephalic, without obvious abnormality, atraumatic. Eyes:  Conjunctivae/corneas clear. PERRL, EOMs intact.    Nose: Nares normal. Septum midline. Mucosa normal. No drainage or sinus tenderness. Throat: Lips, mucosa, and tongue normal. Teeth and gums normal.   Neck: Supple, symmetrical, trachea midline, no adenopathy, thyroid: no enlargment/tenderness/nodules, no carotid bruit and no JVD. Back:   Symmetric, no curvature. ROM normal.   Lungs:   Clear to auscultation bilaterally. Chest wall:  No tenderness or deformity. Heart:  Regular rate and rhythm, S1, S2 normal, no murmur, click, rub or gallop. Abdomen:   Soft, non-tender. Bowel sounds normal. No masses,  No organomegaly. Extremities: Extremities normal, atraumatic, no cyanosis or edema. Warm. Pulses: 2+ and symmetric all extremities. Skin: Skin color, texture, turgor normal. No rashes or lesions   Lymph nodes: Cervical, supraclavicular, and axillary nodes normal.   Neurologic: Grossly nonfocal       Data:   No results found for this or any previous visit (from the past 24 hour(s)). Telemetry:17: Sinus Bradycardia    Imagin-12-17: CT of chest: IMPRESSION:   1. There is extensive atherosclerotic changes in the abdominal aorta and iliac  and femoral arteries as described above. There is a mid to distal abdominal  aortic aneurysm with mural thrombus measuring  5.4 x 4.6 cm. There is an  occluded bifemoral graft in the groin and occluded right external iliac and  femoral artery graft. . There is bilateral SFA occlusion. There is a patent  vessel from the right iliac bifurcation into the right groin medial to the vein  and anastomosis to an occluded bifemoral graft. .  2. In the left upper lobe, there is a 1.5 x 0.7 cm nodule. Comparison with any  prior studies would be helpful. If none are available, PET/CT may be of value. There are few tiny nodules elsewhere in the lungs as well as granulomas within  nodules measuring 5 mm less in size. 3. No acute abnormality identified in the abdomen or pelvis.  There are changes  of diverticulosis throughout the colon particularly in the sigmoid colon. Urinary bladder is incompletely distended and the wall appears slightly  thickened anteriorly. There is mild prostatic enlargement. . Results called the  office by myself      I have personally reviewed the patients radiographs and have reviewed the reports:  9-25-17: FINDINGS: Frontal and lateral views of the chest show clear lungs. . The heart,  mediastinum and pulmonary vasculature are stable. The bony thorax is  unremarkable for age.  ..     IMPRESSION:   No acute cardiopulmonary disease radiographically       Den Hernandez MD

## 2017-09-29 NOTE — ANESTHESIA PROCEDURE NOTES
Arterial Line Placement    Start time: 9/29/2017 7:04 AM  End time: 9/29/2017 7:10 AM  Performed by: Papito Whitmore  Authorized by: Papito Whitmore     Pre-Procedure  Indications:  Arterial pressure monitoring  Preanesthetic Checklist: patient identified, risks and benefits discussed, anesthesia consent, site marked, patient being monitored, timeout performed and patient being monitored      Procedure:   Prep:  Chlorhexidine  Seldinger Technique?: Yes    Orientation:  Right  Location:  Radial artery  Catheter size:  20 G  Number of attempts:  1  Cont Cardiac Output Sensor: No      Assessment:   Post-procedure:  Line secured and sterile dressing applied  Patient Tolerance:  Patient tolerated the procedure well with no immediate complications  Comment:   Risks, benefits, alternatives explained and patient agrees to proceed. Sterile prep with Chlorhexidine. 0.5 mL 1% Lidocaine placed at insertion site.

## 2017-09-29 NOTE — IP AVS SNAPSHOT
[address] [facility] 
[phone] Patient: Bautista Quesada MRN: [id number] NRV:[id number] You are allergic to the following No active allergies Recent Documentation Height Weight BMI Smoking Status 1.829 m 80.4 kg 24.04 kg/m2 Current Every Day Smoker Emergency Contacts Name Discharge Info Relation Home Work Mobile [address] CAREGIVER [3] Spouse [3] [phone] [address] CAREGIVER [3] Daughter [21]   [phone] About your hospitalization You were admitted on:  September 29, 2017 You last received care in the:  [facility] 3 You were discharged on:  October 1, 2017 Unit phone number:  [phone] Why you were hospitalized Your primary diagnosis was:  Not on File Your diagnoses also included:  Aaa (Abdominal Aortic Aneurysm) Without Rupture (Hcc) Providers Seen During Your Hospitalizations Provider Role Specialty Primary office phone Silverio Padilla MD Attending Provider Vascular Surgery [phone] Your Primary Care Physician (PCP) Primary Care Physician Office Phone Office Fax Andrei George [phone] Follow-up Information Follow up With Details Comments Contact Info Kyler Acosta MD   [address] [address] 
[phone] Current Discharge Medication List  
  
START taking these medications Dose & Instructions Dispensing Information Comments Morning Noon Evening Bedtime  
 oxyCODONE-acetaminophen 5-325 mg per tablet Commonly known as:  PERCOCET Your last dose was: Your next dose is:    
   
   
 Dose:  1-2 Tab Take 1-2 Tabs by mouth every four (4) hours as needed. Max Daily Amount: 12 Tabs. Quantity:  40 Tab Refills:  0  
     
   
   
   
  
 tamsulosin 0.4 mg capsule Commonly known as:  FLOMAX Your last dose was: Your next dose is:    
   
   
 Dose:  0.4 mg Take 1 Cap by mouth daily. Quantity:  30 Cap Refills:  0 CONTINUE these medications which have NOT CHANGED Dose & Instructions Dispensing Information Comments Morning Noon Evening Bedtime  
 aspirin 81 mg chewable tablet Your last dose was: Your next dose is:    
   
   
 Dose:  81 mg Take 81 mg by mouth daily. Refills:  0  
     
   
   
   
  
 clopidogrel 75 mg Tab Commonly known as:  PLAVIX Your last dose was: Your next dose is:    
   
   
 Dose:  75 mg Take 75 mg by mouth. Refills:  0  
     
   
   
   
  
 lisinopril 20 mg tablet Commonly known as:  Katie Mi Your last dose was: Your next dose is:    
   
   
 Dose:  20 mg Take 20 mg by mouth daily. Refills:  0  
     
   
   
   
  
 VITAMIN D3 1,000 unit Cap Generic drug:  cholecalciferol Your last dose was: Your next dose is:    
   
   
 Dose:  1000 Units Take 1,000 Units by mouth daily. Refills:  0 Where to Get Your Medications Information on where to get these meds will be given to you by the nurse or doctor. ! Ask your nurse or doctor about these medications  
  oxyCODONE-acetaminophen 5-325 mg per tablet  
 tamsulosin 0.4 mg capsule Discharge Instructions Can shower; can remove dressings and change with band-aid as needed. Endovascular Aortic Aneurysm Repair: What to Expect at Home Your Recovery Endovascular aortic aneurysm repair is a procedure to fix a weak and bulging section of the aorta. The aorta is the large blood vessel (artery) that carries blood from the heart through the belly to the rest of the body. The doctor put a man-made tube called a graft inside the aneurysm.  Blood will pass through the graft in the aorta without pushing on the aneurysm. You can expect the cuts (incisions) in your groin to be sore for 1 to 2 weeks. If you have stitches or staples in your incisions, the doctor may need to take them out. You may feel more tired than usual for 1 to 2 weeks after surgery. You may be able to do many of your usual activities after 1 to 2 weeks. But you will probably need up to 4 weeks to fully recover. Strenuous activities will not hurt the graft in your aorta, but they may cause problems with the incisions in your groin. You can be more active when your groin is no longer sore. This care sheet gives you a general idea about how long it will take for you to recover. But each person recovers at a different pace. Follow the steps below to get better as quickly as possible. How can you care for yourself at home? Activity · Rest when you feel tired. Getting enough sleep will help you recover. · Try to walk each day. Start by walking a little more than you did the day before. Bit by bit, increase the amount you walk. Walking boosts blood flow and helps prevent pneumonia and constipation. · Avoid strenuous activities, such as bicycle riding, jogging, weight lifting, or aerobic exercise. Your doctor will tell you when it's okay to do strenuous activity. · Ask your doctor when you can drive again. · You will probably need to take at least 1 to 2 weeks off from work. It depends on the type of work you do and how you feel. · You may shower as usual. Pat the incisions dry. Do not take a bath for the first 2 weeks, or until your doctor tells you it is okay. Diet · You can eat your normal diet. If your stomach is upset, try bland, low-fat foods like plain rice, broiled chicken, toast, and yogurt. · Drink plenty of fluids (unless your doctor tells you not to). · You may notice that your bowel movements are not regular right after your surgery. This is common.  Try to avoid constipation and straining with bowel movements. You may want to take a fiber supplement every day. If you have not had a bowel movement after a couple of days, ask your doctor about taking a mild laxative. Medicines · Your doctor will tell you if and when you can restart your medicines. He or she will also give you instructions about taking any new medicines. · If you take blood thinners, such as warfarin (Coumadin), clopidogrel (Plavix), or aspirin, be sure to talk to your doctor. He or she will tell you if and when to start taking those medicines again. Make sure that you understand exactly what your doctor wants you to do. · Be safe with medicines. Take pain medicines exactly as directed. ¨ If the doctor gave you a prescription medicine for pain, take it as prescribed. ¨ If you are not taking a prescription pain medicine, ask your doctor if you can take an over-the-counter medicine. ¨ Do not take two or more pain medicines at the same time unless the doctor told you to. Many pain medicines have acetaminophen, which is Tylenol. Too much acetaminophen (Tylenol) can be harmful. · If you think your pain medicine is making you sick to your stomach: 
¨ Take your medicine after meals (unless your doctor has told you not to). ¨ Ask your doctor for a different pain medicine. · If your doctor prescribed antibiotics, take them as directed. Do not stop taking them just because you feel better. You need to take the full course of antibiotics. Incision care · If you have strips of tape on the incisions, leave the tape on for a week or until it falls off. · Wash the area daily with water and pat it dry. Other cleaning products, such as hydrogen peroxide, can make the wounds heal more slowly. You may cover the area with a gauze bandage if it weeps or rubs against clothing. Change the bandage every day. · Keep the area clean and dry. Follow-up care is a key part of your treatment and safety.  Be sure to make and go to all appointments, and call your doctor if you are having problems. It's also a good idea to know your test results and keep a list of the medicines you take. When should you call for help? Call 911 anytime you think you may need emergency care. For example, call if: 
· You passed out (lost consciousness). · You have severe trouble breathing. · You have sudden chest pain and shortness of breath, or you cough up blood or foamy, pink mucus. · You have a lump that is getting bigger under your skin where the incisions were made in your groin. · You have severe pain in your belly. · You have chest pain or pressure. This may occur with: ¨ Sweating. ¨ Shortness of breath. ¨ Nausea or vomiting. ¨ Pain that spreads from the chest to the neck, jaw, or one or both shoulders or arms. ¨ Dizziness or lightheadedness. ¨ A fast or uneven pulse. After calling 911, chew 1 adult-strength or 2 to 4 low-dose aspirin. Wait for an ambulance. Do not try to drive yourself. Call your doctor now or seek immediate medical care if: 
· You have new or increased shortness of breath. · You are dizzy or lightheaded, or you feel like you may faint. · You are sick to your stomach or cannot keep fluids down. · You have pain that does not get better after you take pain medicine. · You have a fever over 100°F. 
· You have loose stitches, or one of your incisions comes open. · Bright red blood has soaked through the bandage over your incisions. · You have signs of infection, such as: 
¨ Increased pain, swelling, warmth, or redness. ¨ Red streaks leading from the incisions. ¨ Pus draining from the incisions. ¨ Swollen lymph nodes in your neck, armpits, or groin. ¨ A fever. · You have signs of a blood clot, such as: 
¨ Pain in your calf, back of the knee, thigh, or groin. ¨ Redness and swelling in your leg or groin. Watch closely for any changes in your health, and be sure to contact your doctor if: · You have sudden weight gain, such as 3 pounds or more in 2 to 3 days. · You have increased swelling in your legs, ankles, or feet. Where can you learn more? Go to http://rakesh-anthony.info/. Enter [phone] in the search box to learn more about \"Endovascular Aortic Aneurysm Repair: What to Expect at Home. \" Current as of: March 20, 2017 Content Version: 11.3 © 0582-5578 [employer]. Care instructions adapted under license by [employer] (which disclaims liability or warranty for this information). If you have questions about a medical condition or this instruction, always ask your healthcare professional. [address] any warranty or liability for your use of this information. Discharge Orders None Introducing [facility] SERVICES! [facility] introduces [employer] patient portal. Now you can access parts of your medical record, email your doctor's office, and request medication refills online. 1. In your internet browser, go to [email or web address] 2. Click on the First Time User? Click Here link in the Sign In box. You will see the New Member Sign Up page. 3. Enter your [employer] Access Code exactly as it appears below. You will not need to use this code after youve completed the sign-up process. If you do not sign up before the expiration date, you must request a new code. · [employer] Access Code: [email or web address] Expires: 12/6/2017  1:51 PM 
 
4. Enter the last four digits of your Social Security Number (xxxx) and Date of Birth (mm/dd/yyyy) as indicated and click Submit. You will be taken to the next sign-up page. 5. Create a [employer] ID. This will be your [employer] login ID and cannot be changed, so think of one that is secure and easy to remember. 6. Create a [employer] password. You can change your password at any time. 7. Enter your Password Reset Question and Answer.  This can be used at a later time if you forget your password. 8. Enter your e-mail address. You will receive e-mail notification when new information is available in [address] 9. Click Sign Up. You can now view and download portions of your medical record. 10. Click the Download Summary menu link to download a portable copy of your medical information. If you have questions, please visit the Frequently Asked Questions section of the [employer] website. Remember, [employer] is NOT to be used for urgent needs. For medical emergencies, dial 911. Now available from your iPhone and Android! General Information Please provide this summary of care documentation to your next provider. Patient Signature:  ____________________________________________________________ Date:  ____________________________________________________________  
  
Devyn Pulliam Provider Signature:  ____________________________________________________________ Date:  ____________________________________________________________

## 2017-09-29 NOTE — PROGRESS NOTES
1430 Patient up from PACU via bed s/p EVAR. Patient awake and alert, oriented x4. Bilateral groin dressings intact and distal pulses present via doppler. Patient complains of lower abdominal pressure/pain in groins. A-line intact and with good waveform. 1440 Medicated with Morphine 2mg IV, will monitor for effect. 1500Patient resting with eyes closed. 1600 Assessment unchanged. 1710 Patient complained of nausea and lower abdominal pain (cramp). Medicated with Zofran and Morphine. 1730 Patient resting with eyes closed. 1800 Weaning Cardene as tolerated. Patient ate 40% of meal.    1900 Report given to Rayna Tristan RN.

## 2017-09-29 NOTE — IP AVS SNAPSHOT
Summary of Care Report The Summary of Care report has been created to help improve care coordination. Users with access to [employer] or [address] (Web-based application) may access additional patient information including the Discharge Summary. If you are not currently a [address] user and need more information, please call the number listed below in the [place] [address] section and ask to be connected with Medical Records. Facility Information Name Address Phone [address] [address] [phone] Patient Information Patient Name Sex  Peri Moreno ([id number]) Male 1945 Discharge Information Admitting Provider Service Area Unit Sukhdev Sanderson MD / [phone] [facility] 2 Critical Care 3 / [phone] Discharge Provider Discharge Date/Time Discharge Disposition Destination (none) 10/1/2017 Midday (Pending) AHR (none) Patient Language Language ENGLISH [13] Hospital Problems as of 10/1/2017  Never Reviewed Class Noted - Resolved Last Modified POA Active Problems AAA (abdominal aortic aneurysm) without rupture ([facility] [address]  2017 - Present 2017 by Sukhdev Sanderson MD Unknown Entered by Sukhdev Sanderson MD  
  
You are allergic to the following No active allergies Current Discharge Medication List  
  
START taking these medications Dose & Instructions Dispensing Information Comments  
 oxyCODONE-acetaminophen 5-325 mg per tablet Commonly known as:  PERCOCET Dose:  1-2 Tab Take 1-2 Tabs by mouth every four (4) hours as needed. Max Daily Amount: 12 Tabs. Quantity:  40 Tab Refills:  0  
   
 tamsulosin 0.4 mg capsule Commonly known as:  FLOMAX Dose:  0.4 mg Take 1 Cap by mouth daily. Quantity:  30 Cap Refills:  0 CONTINUE these medications which have NOT CHANGED Dose & Instructions Dispensing Information Comments  
 aspirin 81 mg chewable tablet Dose:  81 mg Take 81 mg by mouth daily. Refills:  0  
   
 clopidogrel 75 mg Tab Commonly known as:  PLAVIX Dose:  75 mg Take 75 mg by mouth. Refills:  0  
   
 lisinopril 20 mg tablet Commonly known as:  Marilynne Shows Dose:  20 mg Take 20 mg by mouth daily. Refills:  0  
   
 VITAMIN D3 1,000 unit Cap Generic drug:  cholecalciferol Dose:  1000 Units Take 1,000 Units by mouth daily. Refills:  0 Surgery Information ID Date/Time Status Primary Surgeon All Procedures Location [id number] 9/29/2017 [place] MD Marsha ENDOVASCULAR AORTIC ABDOMINAL ANEURYSM REPAIR(EVAR)  [facility] MAIN OR Follow-up Information Follow up With Details Comments Contact Info Rosa Velasquez MD   [address] [address] 
[phone] Discharge Instructions Can shower; can remove dressings and change with band-aid as needed. Endovascular Aortic Aneurysm Repair: What to Expect at Home Your Recovery Endovascular aortic aneurysm repair is a procedure to fix a weak and bulging section of the aorta. The aorta is the large blood vessel (artery) that carries blood from the heart through the belly to the rest of the body. The doctor put a man-made tube called a graft inside the aneurysm. Blood will pass through the graft in the aorta without pushing on the aneurysm. You can expect the cuts (incisions) in your groin to be sore for 1 to 2 weeks. If you have stitches or staples in your incisions, the doctor may need to take them out. You may feel more tired than usual for 1 to 2 weeks after surgery. You may be able to do many of your usual activities after 1 to 2 weeks. But you will probably need up to 4 weeks to fully recover. Strenuous activities will not hurt the graft in your aorta, but they may cause problems with the incisions in your groin. You can be more active when your groin is no longer sore. This care sheet gives you a general idea about how long it will take for you to recover. But each person recovers at a different pace. Follow the steps below to get better as quickly as possible. How can you care for yourself at home? Activity · Rest when you feel tired. Getting enough sleep will help you recover. · Try to walk each day. Start by walking a little more than you did the day before. Bit by bit, increase the amount you walk. Walking boosts blood flow and helps prevent pneumonia and constipation. · Avoid strenuous activities, such as bicycle riding, jogging, weight lifting, or aerobic exercise. Your doctor will tell you when it's okay to do strenuous activity. · Ask your doctor when you can drive again. · You will probably need to take at least 1 to 2 weeks off from work. It depends on the type of work you do and how you feel. · You may shower as usual. Pat the incisions dry. Do not take a bath for the first 2 weeks, or until your doctor tells you it is okay. Diet · You can eat your normal diet. If your stomach is upset, try bland, low-fat foods like plain rice, broiled chicken, toast, and yogurt. · Drink plenty of fluids (unless your doctor tells you not to). · You may notice that your bowel movements are not regular right after your surgery. This is common. Try to avoid constipation and straining with bowel movements. You may want to take a fiber supplement every day. If you have not had a bowel movement after a couple of days, ask your doctor about taking a mild laxative. Medicines · Your doctor will tell you if and when you can restart your medicines. He or she will also give you instructions about taking any new medicines.  
· If you take blood thinners, such as warfarin (Coumadin), clopidogrel (Plavix), or aspirin, be sure to talk to your doctor. He or she will tell you if and when to start taking those medicines again. Make sure that you understand exactly what your doctor wants you to do. · Be safe with medicines. Take pain medicines exactly as directed. ¨ If the doctor gave you a prescription medicine for pain, take it as prescribed. ¨ If you are not taking a prescription pain medicine, ask your doctor if you can take an over-the-counter medicine. ¨ Do not take two or more pain medicines at the same time unless the doctor told you to. Many pain medicines have acetaminophen, which is Tylenol. Too much acetaminophen (Tylenol) can be harmful. · If you think your pain medicine is making you sick to your stomach: 
¨ Take your medicine after meals (unless your doctor has told you not to). ¨ Ask your doctor for a different pain medicine. · If your doctor prescribed antibiotics, take them as directed. Do not stop taking them just because you feel better. You need to take the full course of antibiotics. Incision care · If you have strips of tape on the incisions, leave the tape on for a week or until it falls off. · Wash the area daily with water and pat it dry. Other cleaning products, such as hydrogen peroxide, can make the wounds heal more slowly. You may cover the area with a gauze bandage if it weeps or rubs against clothing. Change the bandage every day. · Keep the area clean and dry. Follow-up care is a key part of your treatment and safety. Be sure to make and go to all appointments, and call your doctor if you are having problems. It's also a good idea to know your test results and keep a list of the medicines you take. When should you call for help? Call 911 anytime you think you may need emergency care. For example, call if: 
· You passed out (lost consciousness). · You have severe trouble breathing.  
· You have sudden chest pain and shortness of breath, or you cough up blood or foamy, pink mucus. · You have a lump that is getting bigger under your skin where the incisions were made in your groin. · You have severe pain in your belly. · You have chest pain or pressure. This may occur with: ¨ Sweating. ¨ Shortness of breath. ¨ Nausea or vomiting. ¨ Pain that spreads from the chest to the neck, jaw, or one or both shoulders or arms. ¨ Dizziness or lightheadedness. ¨ A fast or uneven pulse. After calling 911, chew 1 adult-strength or 2 to 4 low-dose aspirin. Wait for an ambulance. Do not try to drive yourself. Call your doctor now or seek immediate medical care if: 
· You have new or increased shortness of breath. · You are dizzy or lightheaded, or you feel like you may faint. · You are sick to your stomach or cannot keep fluids down. · You have pain that does not get better after you take pain medicine. · You have a fever over 100°F. 
· You have loose stitches, or one of your incisions comes open. · Bright red blood has soaked through the bandage over your incisions. · You have signs of infection, such as: 
¨ Increased pain, swelling, warmth, or redness. ¨ Red streaks leading from the incisions. ¨ Pus draining from the incisions. ¨ Swollen lymph nodes in your neck, armpits, or groin. ¨ A fever. · You have signs of a blood clot, such as: 
¨ Pain in your calf, back of the knee, thigh, or groin. ¨ Redness and swelling in your leg or groin. Watch closely for any changes in your health, and be sure to contact your doctor if: 
· You have sudden weight gain, such as 3 pounds or more in 2 to 3 days. · You have increased swelling in your legs, ankles, or feet. Where can you learn more? Go to http://rakesh-anthony.info/. Enter [phone] in the search box to learn more about \"Endovascular Aortic Aneurysm Repair: What to Expect at Home. \" Current as of: March 20, 2017 Content Version: 11.3 © 8442-4597 [employer]wise[employer]. Care instructions adapted under license by [employer] (which disclaims liability or warranty for this information). If you have questions about a medical condition or this instruction, always ask your healthcare professional. [address] [place] [address] any warranty or liability for your use of this information. Chart Review Routing History No Routing History on File

## 2017-09-29 NOTE — PROGRESS NOTES
PT note:    Orders received and acknowledged. Chart reviewed and noted patient had EVAR procedure today. Per guidelines, will follow up tomorrow for PT evaluation.      Betty Carpenter, PT, DPT

## 2017-09-29 NOTE — PERIOP NOTES
TRANSFER - OUT REPORT:    Verbal report given to Mark Anthony Pearson RN on Khadar Vyas  being transferred to [address] [place] [address] for routine post - op       Report consisted of patients Situation, Background, Assessment and   Recommendations(SBAR). Information from the following report(s) OR Summary, Procedure Summary, Intake/Output and MAR was reviewed with the receiving nurse. Opportunity for questions and clarification was provided.       Patient transported with:   Monitor  O2 @ 3 liters  Registered Nurse  [facility]

## 2017-09-29 NOTE — IP AVS SNAPSHOT
[address] [facility] 
[phone] Patient: Edwardo Chowdary MRN: [id number] Current Discharge Medication List  
  
START taking these medications Dose & Instructions Dispensing Information Comments Morning Noon Evening Bedtime  
 oxyCODONE-acetaminophen 5-325 mg per tablet Commonly known as:  PERCOCET Your last dose was: Your next dose is:    
   
   
 Dose:  1-2 Tab Take 1-2 Tabs by mouth every four (4) hours as needed. Max Daily Amount: 12 Tabs. Quantity:  40 Tab Refills:  0  
     
   
   
   
  
 tamsulosin 0.4 mg capsule Commonly known as:  FLOMAX Your last dose was: Your next dose is:    
   
   
 Dose:  0.4 mg Take 1 Cap by mouth daily. Quantity:  30 Cap Refills:  0 CONTINUE these medications which have NOT CHANGED Dose & Instructions Dispensing Information Comments Morning Noon Evening Bedtime  
 aspirin 81 mg chewable tablet Your last dose was: Your next dose is:    
   
   
 Dose:  81 mg Take 81 mg by mouth daily. Refills:  0  
     
   
   
   
  
 clopidogrel 75 mg Tab Commonly known as:  PLAVIX Your last dose was: Your next dose is:    
   
   
 Dose:  75 mg Take 75 mg by mouth. Refills:  0  
     
   
   
   
  
 lisinopril 20 mg tablet Commonly known as:  Ronalee Ruffing Your last dose was: Your next dose is:    
   
   
 Dose:  20 mg Take 20 mg by mouth daily. Refills:  0  
     
   
   
   
  
 VITAMIN D3 1,000 unit Cap Generic drug:  cholecalciferol Your last dose was: Your next dose is:    
   
   
 Dose:  1000 Units Take 1,000 Units by mouth daily. Refills:  0 Where to Get Your Medications Information on where to get these meds will be given to you by the nurse or doctor. ! Ask your nurse or doctor about these medications  
  oxyCODONE-acetaminophen 5-325 mg per tablet  
 tamsulosin 0.4 mg capsule

## 2017-09-29 NOTE — ANESTHESIA POSTPROCEDURE EVALUATION
Post-Anesthesia Evaluation and Assessment    Patient: Rafael Fair MRN: [id number]  SSN: [id number]    YOB: 1945  Age: 66 y.o. Sex: male       Cardiovascular Function/Vital Signs  Visit Vitals    /60    Pulse (!) 59    Temp 36.6 °C (97.8 °F)    Resp 11    Ht 6' (1.829 m)    Wt 77.1 kg (169 lb 15.6 oz)    SpO2 98%    BMI 23.05 kg/m2       Patient is status post general anesthesia for Procedure(s):  ENDOVASCULAR AORTIC ABDOMINAL ANEURYSM REPAIR(EVAR) . Nausea/Vomiting: None    Postoperative hydration reviewed and adequate. Pain:  Pain Scale 1: Numeric (0 - 10) (09/29/17 1215)  Pain Intensity 1: 0 (09/29/17 1215)   Managed    Neurological Status:   Neuro (WDL): Exceptions to WDL (09/29/17 1057)  Neuro  Neurologic State: Drowsy; Eyes open to voice (09/29/17 1057)  Orientation Level: Oriented to person (09/29/17 1057)  Cognition: Follows commands (09/29/17 1057)  Speech: Clear (09/29/17 1057)  Assessment L Pupil: Deferred (09/29/17 0705)  Assessment R Pupil: Deferred (09/29/17 0705)  LUE Motor Response: Purposeful (09/29/17 1057)  LLE Motor Response: Purposeful (09/29/17 1057)  RUE Motor Response: Purposeful (09/29/17 1057)  RLE Motor Response: Purposeful (09/29/17 1057)   At baseline    Mental Status and Level of Consciousness: Arousable    Pulmonary Status:   O2 Device: Nasal cannula (09/29/17 1057)   Adequate oxygenation and airway patent    Complications related to anesthesia: None    Post-anesthesia assessment completed.  No concerns    Signed By: Rosana Espinoza MD     September 29, 2017

## 2017-09-29 NOTE — PERIOP NOTES
Handoff Report from Operating Room to PACU    Report received from [facility]. RN and Duy Reed CRNA regarding Estelle Galarza. Surgeon(s): Margaret Mi MD  And Procedure(s) (LRB):  ENDOVASCULAR AORTIC ABDOMINAL ANEURYSM REPAIR(EVAR)  (N/A)  confirmed   with dressings discussed. Anesthesia type, drugs, patient history, complications, estimated blood loss, vital signs, intake and output, and last pain medication, lines, reversal medications and temperature were reviewed.

## 2017-09-30 LAB
ALBUMIN SERPL-MCNC: 2.9 G/DL (ref 3.5–5)
ALBUMIN/GLOB SERPL: 0.9 {RATIO} (ref 1.1–2.2)
ALP SERPL-CCNC: 56 U/L (ref 45–117)
ALT SERPL-CCNC: 15 U/L (ref 12–78)
ANION GAP SERPL CALC-SCNC: 6 MMOL/L (ref 5–15)
AST SERPL-CCNC: 16 U/L (ref 15–37)
BASOPHILS # BLD: 0 K/UL (ref 0–0.1)
BASOPHILS NFR BLD: 0 % (ref 0–1)
BILIRUB SERPL-MCNC: 0.4 MG/DL (ref 0.2–1)
BUN SERPL-MCNC: 18 MG/DL (ref 6–20)
BUN/CREAT SERPL: 12 (ref 12–20)
CALCIUM SERPL-MCNC: 7.7 MG/DL (ref 8.5–10.1)
CHLORIDE SERPL-SCNC: 107 MMOL/L (ref 97–108)
CO2 SERPL-SCNC: 24 MMOL/L (ref 21–32)
CREAT SERPL-MCNC: 1.48 MG/DL (ref 0.7–1.3)
EOSINOPHIL # BLD: 0 K/UL (ref 0–0.4)
EOSINOPHIL NFR BLD: 0 % (ref 0–7)
ERYTHROCYTE [DISTWIDTH] IN BLOOD BY AUTOMATED COUNT: 14.6 % (ref 11.5–14.5)
GLOBULIN SER CALC-MCNC: 3.1 G/DL (ref 2–4)
GLUCOSE SERPL-MCNC: 132 MG/DL (ref 65–100)
HCT VFR BLD AUTO: 39.8 % (ref 36.6–50.3)
HGB BLD-MCNC: 13.8 G/DL (ref 12.1–17)
LYMPHOCYTES # BLD: 1.1 K/UL (ref 0.8–3.5)
LYMPHOCYTES NFR BLD: 6 % (ref 12–49)
MAGNESIUM SERPL-MCNC: 1.9 MG/DL (ref 1.6–2.4)
MCH RBC QN AUTO: 31.4 PG (ref 26–34)
MCHC RBC AUTO-ENTMCNC: 34.7 G/DL (ref 30–36.5)
MCV RBC AUTO: 90.7 FL (ref 80–99)
MONOCYTES # BLD: 1.5 K/UL (ref 0–1)
MONOCYTES NFR BLD: 9 % (ref 5–13)
NEUTS SEG # BLD: 14.7 K/UL (ref 1.8–8)
NEUTS SEG NFR BLD: 85 % (ref 32–75)
PLATELET # BLD AUTO: 156 K/UL (ref 150–400)
POTASSIUM SERPL-SCNC: 4.1 MMOL/L (ref 3.5–5.1)
PROT SERPL-MCNC: 6 G/DL (ref 6.4–8.2)
RBC # BLD AUTO: 4.39 M/UL (ref 4.1–5.7)
SODIUM SERPL-SCNC: 137 MMOL/L (ref 136–145)
WBC # BLD AUTO: 17.3 K/UL (ref 4.1–11.1)

## 2017-09-30 PROCEDURE — 85025 COMPLETE CBC W/AUTO DIFF WBC: CPT | Performed by: INTERNAL MEDICINE

## 2017-09-30 PROCEDURE — 74011000258 HC RX REV CODE- 258: Performed by: SURGERY

## 2017-09-30 PROCEDURE — 36415 COLL VENOUS BLD VENIPUNCTURE: CPT | Performed by: INTERNAL MEDICINE

## 2017-09-30 PROCEDURE — G8979 MOBILITY GOAL STATUS: HCPCS

## 2017-09-30 PROCEDURE — 97116 GAIT TRAINING THERAPY: CPT

## 2017-09-30 PROCEDURE — 65610000006 HC RM INTENSIVE CARE

## 2017-09-30 PROCEDURE — G8987 SELF CARE CURRENT STATUS: HCPCS | Performed by: OCCUPATIONAL THERAPIST

## 2017-09-30 PROCEDURE — 74011000250 HC RX REV CODE- 250: Performed by: SURGERY

## 2017-09-30 PROCEDURE — G8978 MOBILITY CURRENT STATUS: HCPCS

## 2017-09-30 PROCEDURE — 97165 OT EVAL LOW COMPLEX 30 MIN: CPT | Performed by: OCCUPATIONAL THERAPIST

## 2017-09-30 PROCEDURE — 80053 COMPREHEN METABOLIC PANEL: CPT | Performed by: INTERNAL MEDICINE

## 2017-09-30 PROCEDURE — G8988 SELF CARE GOAL STATUS: HCPCS | Performed by: OCCUPATIONAL THERAPIST

## 2017-09-30 PROCEDURE — 74011250637 HC RX REV CODE- 250/637: Performed by: SURGERY

## 2017-09-30 PROCEDURE — 83735 ASSAY OF MAGNESIUM: CPT | Performed by: INTERNAL MEDICINE

## 2017-09-30 PROCEDURE — 51798 US URINE CAPACITY MEASURE: CPT

## 2017-09-30 PROCEDURE — 77030027138 HC INCENT SPIROMETER -A

## 2017-09-30 PROCEDURE — 74011250636 HC RX REV CODE- 250/636: Performed by: SURGERY

## 2017-09-30 PROCEDURE — 97161 PT EVAL LOW COMPLEX 20 MIN: CPT

## 2017-09-30 RX ORDER — SODIUM CHLORIDE 0.9 % (FLUSH) 0.9 %
SYRINGE (ML) INJECTION
Status: COMPLETED
Start: 2017-09-30 | End: 2017-09-30

## 2017-09-30 RX ORDER — TAMSULOSIN HYDROCHLORIDE 0.4 MG/1
0.4 CAPSULE ORAL DAILY
Status: DISCONTINUED | OUTPATIENT
Start: 2017-09-30 | End: 2017-10-01 | Stop reason: HOSPADM

## 2017-09-30 RX ADMIN — ONDANSETRON 4 MG: 2 INJECTION INTRAMUSCULAR; INTRAVENOUS at 23:10

## 2017-09-30 RX ADMIN — TAMSULOSIN HYDROCHLORIDE 0.4 MG: 0.4 CAPSULE ORAL at 23:09

## 2017-09-30 RX ADMIN — Medication 5 MG/HR: at 01:58

## 2017-09-30 RX ADMIN — Medication 3 MG/HR: at 02:10

## 2017-09-30 RX ADMIN — BISACODYL 10 MG: 10 SUPPOSITORY RECTAL at 07:47

## 2017-09-30 RX ADMIN — MAGNESIUM HYDROXIDE 30 ML: 400 SUSPENSION ORAL at 18:27

## 2017-09-30 RX ADMIN — MORPHINE SULFATE 2 MG: 2 INJECTION, SOLUTION INTRAMUSCULAR; INTRAVENOUS at 06:49

## 2017-09-30 RX ADMIN — ONDANSETRON 4 MG: 2 INJECTION INTRAMUSCULAR; INTRAVENOUS at 18:27

## 2017-09-30 RX ADMIN — CEFAZOLIN 2 G: 10 INJECTION, POWDER, FOR SOLUTION INTRAVENOUS; PARENTERAL at 00:56

## 2017-09-30 RX ADMIN — MORPHINE SULFATE 4 MG: 10 INJECTION INTRAMUSCULAR; INTRAVENOUS; SUBCUTANEOUS at 12:29

## 2017-09-30 RX ADMIN — ASPIRIN 81 MG 81 MG: 81 TABLET ORAL at 09:08

## 2017-09-30 RX ADMIN — ONDANSETRON 4 MG: 2 INJECTION INTRAMUSCULAR; INTRAVENOUS at 06:47

## 2017-09-30 RX ADMIN — SODIUM CHLORIDE 50 ML/HR: 900 INJECTION, SOLUTION INTRAVENOUS at 23:39

## 2017-09-30 RX ADMIN — MORPHINE SULFATE 2 MG: 2 INJECTION, SOLUTION INTRAMUSCULAR; INTRAVENOUS at 16:37

## 2017-09-30 RX ADMIN — Medication 10 ML: at 23:10

## 2017-09-30 RX ADMIN — OXYCODONE AND ACETAMINOPHEN 2 TABLET: 5; 325 TABLET ORAL at 23:14

## 2017-09-30 RX ADMIN — CLOPIDOGREL BISULFATE 75 MG: 75 TABLET ORAL at 09:08

## 2017-09-30 RX ADMIN — SODIUM CHLORIDE 50 ML/HR: 900 INJECTION, SOLUTION INTRAVENOUS at 01:56

## 2017-09-30 RX ADMIN — MORPHINE SULFATE 2 MG: 2 INJECTION, SOLUTION INTRAMUSCULAR; INTRAVENOUS at 07:41

## 2017-09-30 RX ADMIN — ONDANSETRON 4 MG: 2 INJECTION INTRAMUSCULAR; INTRAVENOUS at 01:13

## 2017-09-30 RX ADMIN — LISINOPRIL 20 MG: 20 TABLET ORAL at 09:08

## 2017-09-30 RX ADMIN — MORPHINE SULFATE 2 MG: 2 INJECTION, SOLUTION INTRAMUSCULAR; INTRAVENOUS at 01:16

## 2017-09-30 NOTE — PROGRESS NOTES
[facility]  Pulmonary, Critical Care, and Sleep Medicine    Name: Michael Mcknight MRN: [id number]   : 1945 Hospital: [address] [facility]   Date: 2017        Critical Care Initial Patient Consult    IMPRESSION:   · S/p EVAR  For AAA per Dr. Paula Aguayo on 17. · Left Upper Lobe 1.5cm x 0.7cm nodule - patient and family aware of need for f/u  · Decreased urine output  · Smoker  · Hypertension  · CAD  · PAD  · Arthritis  · Daily etoh use, 2-3 beers per day. RECOMMENDATIONS:   · BP control  · Monitor for bleeding  · Monitor for DTs  · Will need outpt pulmonary follow up for re-evaluation of the Pulmonary nodule  · Replace blount as needed     Subjective/History: This patient has been seen and evaluated at the request of Dr. Paula Aguayo for above. Patient is a 66 y.o. male who is seen in the ICU for above. He has mild groin pain and mild nausea. ROS is otherwise negative. Past Medical History:   Diagnosis Date    Aneurysm ([address]     aortic aneurysm     Arthritis     CAD (coronary artery disease)     Hypertension     Ill-defined condition 2008    PVD    Thromboembolus ([address]       Past Surgical History:   Procedure Laterality Date    CARDIAC SURG PROCEDURE UNLIST  2008    stent- prox RCA     HX APPENDECTOMY      HX ORTHOPAEDIC      right ankle broken- screw and pins     HX ROTATOR CUFF REPAIR      left shoulder     HX VASCULAR ACCESS      bypass done x3       Prior to Admission medications    Medication Sig Start Date End Date Taking? Authorizing Provider   aspirin 81 mg chewable tablet Take 81 mg by mouth daily. Historical Provider   cholecalciferol (VITAMIN D3) 1,000 unit cap Take 1,000 Units by mouth daily. Historical Provider   lisinopril (PRINIVIL, ZESTRIL) 20 mg tablet Take 20 mg by mouth daily. Historical Provider   clopidogrel (PLAVIX) 75 mg tab Take 75 mg by mouth.     Historical Provider     Current Facility-Administered Medications   Medication Dose Route Frequency    lactated Ringers infusion  25 mL/hr IntraVENous CONTINUOUS    aspirin chewable tablet 81 mg  81 mg Oral DAILY    clopidogrel (PLAVIX) tablet 75 mg  75 mg Oral DAILY    0.9% sodium chloride infusion  50 mL/hr IntraVENous CONTINUOUS    niCARdipine (CARDENE) 25 mg in 0.9% sodium chloride 250 mL infusion  0-15 mg/hr IntraVENous TITRATE    PHENYLephrine (NEOSYNEPHRINE) 10,000 mcg in 0.9% sodium chloride 250 mL infusion   mcg/min IntraVENous TITRATE    lisinopril (PRINIVIL, ZESTRIL) tablet 20 mg  20 mg Oral DAILY     No Known Allergies   Social History   Substance Use Topics    Smoking status: Current Every Day Smoker     Packs/day: 0.50    Smokeless tobacco: Never Used    Alcohol use 6.6 oz/week     5 Cans of beer, 6 Shots of liquor per week      Comment: drinks mix drink 1/night  and a few beers on weekends       History reviewed. No pertinent family history. Review of Systems:  A comprehensive review of systems was negative. Objective:   Vital Signs:    Visit Vitals    /62    Pulse 77    Temp 98.5 °F (36.9 °C)    Resp 20    Ht 6' (1.829 m)    Wt 70.9 kg (156 lb 4.9 oz)    SpO2 94%    BMI 21.2 kg/m2       O2 Device: Room air   O2 Flow Rate (L/min): 3 l/min   Temp (24hrs), Av.2 °F (36.8 °C), Min:97.4 °F (36.3 °C), Max:98.9 °F (37.2 °C)       Intake/Output:   Last shift:       07 -  190  In: 320 [P.O.:120; I.V.:200]  Out: 200 [Urine:200]  Last 3 shifts: 1901 -  0700  In: 3435.7 [P.O.:60; I.V.:3375.7]  Out: 1610 [Urine:1360]    Intake/Output Summary (Last 24 hours) at 17 1125  Last data filed at 17 1100   Gross per 24 hour   Intake          2005.65 ml   Output             1410 ml   Net           595.65 ml     Physical Exam:    General:  Alert, cooperative, no distress, appears stated age. Head:  Normocephalic, without obvious abnormality, atraumatic.    Eyes: Conjunctivae/corneas clear. PERRL, EOMs intact. Nose: Nares normal. Septum midline. Mucosa normal. No drainage or sinus tenderness. Throat: Lips, mucosa, and tongue normal. Teeth and gums normal.   Neck: Supple, symmetrical, trachea midline, no adenopathy, thyroid: no enlargment/tenderness/nodules, no carotid bruit and no JVD. Back:   Symmetric, no curvature. ROM normal.   Lungs:   Clear to auscultation bilaterally. Chest wall:  No tenderness or deformity. Heart:  Regular rate and rhythm, S1, S2 normal, no murmur, click, rub or gallop. Abdomen:   Soft, non-tender. Bowel sounds normal. No masses,  No organomegaly. Extremities: Extremities normal, atraumatic, no cyanosis or edema. Warm. Pulses: 2+ and symmetric all extremities. Skin: Skin color, texture, turgor normal. No rashes or lesions   Lymph nodes: Cervical, supraclavicular, and axillary nodes normal.   Neurologic: Grossly nonfocal       Data:     Recent Results (from the past 24 hour(s))   CBC WITH AUTOMATED DIFF    Collection Time: 09/30/17  3:27 AM   Result Value Ref Range    WBC 17.3 (H) 4.1 - 11.1 K/uL    RBC 4.39 4. 10 - 5.70 M/uL    HGB 13.8 12.1 - 17.0 g/dL    HCT 39.8 36.6 - 50.3 %    MCV 90.7 80.0 - 99.0 FL    MCH 31.4 26.0 - 34.0 PG    MCHC 34.7 30.0 - 36.5 g/dL    RDW 14.6 (H) 11.5 - 14.5 %    PLATELET 452 766 - 343 K/uL    NEUTROPHILS 85 (H) 32 - 75 %    LYMPHOCYTES 6 (L) 12 - 49 %    MONOCYTES 9 5 - 13 %    EOSINOPHILS 0 0 - 7 %    BASOPHILS 0 0 - 1 %    ABS. NEUTROPHILS 14.7 (H) 1.8 - 8.0 K/UL    ABS. LYMPHOCYTES 1.1 0.8 - 3.5 K/UL    ABS. MONOCYTES 1.5 (H) 0.0 - 1.0 K/UL    ABS. EOSINOPHILS 0.0 0.0 - 0.4 K/UL    ABS.  BASOPHILS 0.0 0.0 - 0.1 K/UL   METABOLIC PANEL, COMPREHENSIVE    Collection Time: 09/30/17  3:27 AM   Result Value Ref Range    Sodium 137 136 - 145 mmol/L    Potassium 4.1 3.5 - 5.1 mmol/L    Chloride 107 97 - 108 mmol/L    CO2 24 21 - 32 mmol/L    Anion gap 6 5 - 15 mmol/L    Glucose 132 (H) 65 - 100 mg/dL    BUN 18 6 - 20 MG/DL    Creatinine 1.48 (H) 0.70 - 1.30 MG/DL    BUN/Creatinine ratio 12 12 - 20      GFR est AA 56 (L) >60 ml/min/1.73m2    GFR est non-AA 47 (L) >60 ml/min/1.73m2    Calcium 7.7 (L) 8.5 - 10.1 MG/DL    Bilirubin, total 0.4 0.2 - 1.0 MG/DL    ALT (SGPT) 15 12 - 78 U/L    AST (SGOT) 16 15 - 37 U/L    Alk. phosphatase 56 45 - 117 U/L    Protein, total 6.0 (L) 6.4 - 8.2 g/dL    Albumin 2.9 (L) 3.5 - 5.0 g/dL    Globulin 3.1 2.0 - 4.0 g/dL    A-G Ratio 0.9 (L) 1.1 - 2.2     MAGNESIUM    Collection Time: 17  3:27 AM   Result Value Ref Range    Magnesium 1.9 1.6 - 2.4 mg/dL             Telemetry:17: Sinus Bradycardia    Imagin-12-17: CT of chest: IMPRESSION:   1. There is extensive atherosclerotic changes in the abdominal aorta and iliac  and femoral arteries as described above. There is a mid to distal abdominal  aortic aneurysm with mural thrombus measuring  5.4 x 4.6 cm. There is an  occluded bifemoral graft in the groin and occluded right external iliac and  femoral artery graft. . There is bilateral SFA occlusion. There is a patent  vessel from the right iliac bifurcation into the right groin medial to the vein  and anastomosis to an occluded bifemoral graft. .  2. In the left upper lobe, there is a 1.5 x 0.7 cm nodule. Comparison with any  prior studies would be helpful. If none are available, PET/CT may be of value. There are few tiny nodules elsewhere in the lungs as well as granulomas within  nodules measuring 5 mm less in size. 3. No acute abnormality identified in the abdomen or pelvis. There are changes  of diverticulosis throughout the colon particularly in the sigmoid colon. Urinary bladder is incompletely distended and the wall appears slightly  thickened anteriorly. There is mild prostatic enlargement. . Results called the  office by myself      I have personally reviewed the patients radiographs and have reviewed the reports:  17: FINDINGS: Frontal and lateral views of the chest show clear lungs. . The heart,  mediastinum and pulmonary vasculature are stable. The bony thorax is  unremarkable for age.  ..     IMPRESSION:   No acute cardiopulmonary disease radiographically       Margarita Vasquez MD

## 2017-09-30 NOTE — PROGRESS NOTES
0041   Patient received at 1900 hours last evening; alert, oriented, moving all extremities; speech clear; patient was on nasal cannula and put to room air at 2000 hours; no respiratory distress; patient in sinus rhythm; right radial a-line intact; patient has one peripheral IV site infusing NS at 50 cc/hr and cardene drip currently at 4 mg/hr; patient is on po diet; no nausea, no emesis this shift so far; abd round, semi-soft, no bowel sounds noted; blount catheter draining clear yellow urine; dressings right and left groin dry and intact, no hematoma; pulses pedal and PT right and left by doppler; patient had complaint of pain at the beginning of the shift and refused all pain medication until around 2200 hours last evening and patient consented to be given morphine 2 mg IV; patient resting well after that.    0128  Patient has been sleeping well since morphine given last evening around 2200 hours; patient awake now, complaint of pain (more in right groin than left) and has complaint of nausea and had emesis (undigested food) of about 30 cc; patient given morphine 2 mg IV now and zofran 4 mg IV now; abd still round, distended, semi-soft; no bowel sounds noted, however, patient is passing gas; patient does not want milk of magnesia now and requested that SCD's be taken off for a while;      0549   Patient has rested off and on tonight; cardene drip weaned off around 0330 hours; right a-line has positional, sometimes dampened wave form now; a-line still has blood return; no further emesis; no BM this shift; lab done; no contact with family during the night.       8149   Patient nauseated again around 0630 hours and given zofran 4 mg IV; patient also stating pain level is a 7 out of 10; patient consented to take morphine 2 kareen IV again (I encouraged patient to take the morphine 4 mg IV dose, however, he did not want to) and I talked to patient about the milk of magnesia and bisacodyl suppository again; patient belching some also and not passing as much gas now; patient taking po ice chips, sips of water and sips of gingerale; patient did consent to take the remaining morphine 2 mg IV around 0740 hours (given) and patient was given dulcolax suppository also; chair at bedside ready for patient to get out of bed this AM; blount catheter discontinued at 0630 hours (MD order to D/C blount at 0600 hours); cardene drip still off; a-line still dampens at times and positional; report given to Anel Jha, [employer].

## 2017-09-30 NOTE — PROGRESS NOTES
0715 Report received from [facility]. 0800 Assessment completed. Patient alert and oriented x4. Patient complaining of nausea and pain at present in Right groin incision site, patient just medicated with Morphine IV PRN and previously medicated with Zofran IV PRN. Dressings intact to Right and Left groin, no hematoma or bleeding noted. Patient complaining of being constipated and gas, Dulcolax suppository PRN. Patient currently on RA, no complaints of SOB at present. ART line intact, SBP less than 160 at present. No other issues noted. 1020 Patient up with assist x1 to use toilet. Patient voided 75ml of yellow colored urine. Dulcolax suppository PRN not effective at this time. Patient then placed in chair. Patient tolerated getting up well. 1035 Physical therapy in room to see and work with patient. 1040 Patient ambulating in CCU hallway on monitor with Physical Therapy. 1130 Patient complaining of not being able to urinate, complains of pain and pressure in lower abdomen, bladder scan 350. Dr. Sara Chandra notified and per Dr. Sara Chandra reinsert Blount catheter. Dr. Polina Spicer in room to see patient, per Dr. Polina Spicer reinsert blount catheter. 1200 ART line removed per order, patient tolerated procedure well. No change from previous assessment. Patient denies nausea at present. Patient refusing any more PRN constipation medication at this time. 1610 Assessment unchanged. No other issues noted. 1845 Complains of nausea and constipation, Zofran IV PRN and MOM PO PRN given. 1900 Report given to [facility].

## 2017-09-30 NOTE — PROGRESS NOTES
Vascular Surgery  --c/o abdominal distension/ feels constipated and having difficulty urinating  Patient Vitals for the past 12 hrs:   Temp Pulse Resp BP SpO2   09/30/17 1102 - 77 20 123/62 94 %   09/30/17 1000 - 77 18 134/64 97 %   09/30/17 0900 - 73 16 131/60 97 %   09/30/17 0800 98.5 °F (36.9 °C) 74 16 134/56 96 %   09/30/17 0730 - 75 20 135/60 95 %   09/30/17 0700 - 73 19 132/57 97 %   09/30/17 0630 - 76 19 132/66 97 %   09/30/17 0600 - 72 15 137/62 96 %   09/30/17 0530 - 73 19 123/58 95 %   09/30/17 0500 - 75 17 128/61 96 %   09/30/17 0430 - 75 16 132/61 95 %   09/30/17 0400 - 72 15 131/61 96 %   09/30/17 0334 98.9 °F (37.2 °C) - - - -   09/30/17 0330 - 78 17 129/61 96 %   09/30/17 0300 - 74 16 127/58 94 %   09/30/17 0230 - 76 18 121/56 94 %   09/30/17 0216 - 77 17 123/57 93 %   09/30/17 0200 - 78 18 125/59 92 %   09/30/17 0130 - 80 18 - 93 %   09/30/17 0100 - 81 21 138/63 95 %   09/30/17 0030 - 74 16 - 94 %   09/30/17 0001 - 75 16 133/57 93 %   09/30/17 0000 98.9 °F (37.2 °C) 74 16 - 93 %     --Abdomen is moderately distended  --Feet are warm bilaterally  --Groin dressings dry    Recent Results (from the past 12 hour(s))   CBC WITH AUTOMATED DIFF    Collection Time: 09/30/17  3:27 AM   Result Value Ref Range    WBC 17.3 (H) 4.1 - 11.1 K/uL    RBC 4.39 4. 10 - 5.70 M/uL    HGB 13.8 12.1 - 17.0 g/dL    HCT 39.8 36.6 - 50.3 %    MCV 90.7 80.0 - 99.0 FL    MCH 31.4 26.0 - 34.0 PG    MCHC 34.7 30.0 - 36.5 g/dL    RDW 14.6 (H) 11.5 - 14.5 %    PLATELET 425 371 - 574 K/uL    NEUTROPHILS 85 (H) 32 - 75 %    LYMPHOCYTES 6 (L) 12 - 49 %    MONOCYTES 9 5 - 13 %    EOSINOPHILS 0 0 - 7 %    BASOPHILS 0 0 - 1 %    ABS. NEUTROPHILS 14.7 (H) 1.8 - 8.0 K/UL    ABS. LYMPHOCYTES 1.1 0.8 - 3.5 K/UL    ABS. MONOCYTES 1.5 (H) 0.0 - 1.0 K/UL    ABS. EOSINOPHILS 0.0 0.0 - 0.4 K/UL    ABS.  BASOPHILS 0.0 0.0 - 0.1 K/UL   METABOLIC PANEL, COMPREHENSIVE    Collection Time: 09/30/17  3:27 AM   Result Value Ref Range    Sodium 137 136 - 145 mmol/L    Potassium 4.1 3.5 - 5.1 mmol/L    Chloride 107 97 - 108 mmol/L    CO2 24 21 - 32 mmol/L    Anion gap 6 5 - 15 mmol/L    Glucose 132 (H) 65 - 100 mg/dL    BUN 18 6 - 20 MG/DL    Creatinine 1.48 (H) 0.70 - 1.30 MG/DL    BUN/Creatinine ratio 12 12 - 20      GFR est AA 56 (L) >60 ml/min/1.73m2    GFR est non-AA 47 (L) >60 ml/min/1.73m2    Calcium 7.7 (L) 8.5 - 10.1 MG/DL    Bilirubin, total 0.4 0.2 - 1.0 MG/DL    ALT (SGPT) 15 12 - 78 U/L    AST (SGOT) 16 15 - 37 U/L    Alk.  phosphatase 56 45 - 117 U/L    Protein, total 6.0 (L) 6.4 - 8.2 g/dL    Albumin 2.9 (L) 3.5 - 5.0 g/dL    Globulin 3.1 2.0 - 4.0 g/dL    A-G Ratio 0.9 (L) 1.1 - 2.2     MAGNESIUM    Collection Time: 09/30/17  3:27 AM   Result Value Ref Range    Magnesium 1.9 1.6 - 2.4 mg/dL     A/P  --S/P EVAR  --check bladder scan--will likely need Edmond replaced  --when distension is relieved he will feel better  --home tomorrow if improves  --can transfer to floor once these issues are resolved

## 2017-09-30 NOTE — PROGRESS NOTES
Problem: Mobility Impaired (Adult and Pediatric)  Goal: *Acute Goals and Plan of Care (Insert Text)  Physical Therapy Goals  Initiated 9/30/2017  1. Patient will move from supine to sit and sit to supine , scoot up and down and roll side to side in bed with independence within 7 day(s). 2. Patient will transfer from bed to chair and chair to bed with independence using the least restrictive device within 7 day(s). 3. Patient will perform sit to stand with independence within 7 day(s). 4. Patient will ambulate with independence for 300 feet with the least restrictive device within 7 day(s). 5. Patient will ascend/descend 2 stairs with 1 handrail(s) with independence within 7 day(s). PHYSICAL THERAPY EVALUATION  Patient: Zackary Wood (10 y.o. male)  Date: 9/30/2017  Primary Diagnosis: AAA  AAA (abdominal aortic aneurysm) without rupture (HCC)  Procedure(s) (LRB):  ENDOVASCULAR AORTIC ABDOMINAL ANEURYSM REPAIR(EVAR)  (N/A) 1 Day Post-Op   Precautions:          ASSESSMENT :  Based on the objective data described below, the patient presents with impaired functional mobility secondary to increased incisional site pain in addition to pain related to blount removal which impairs gait mechanics, dynamic balance, and activity tolerance. Pt received seated in bedside chair, agreeable to participation with therapy despite complaints of \"12/10\" pain. Pt sit>>stand at supervision level and ambulated 220ft with CG-SBA. Pt exhibited a widened TERESITA with decreased gait speed and mild increase in lateral trunk sway. No overt LOB/difficulty noted. Gait impairments demonstrated this date were likely due to increased pain associated with blount removal. Anticipate that as pain is controlled, pt will continue to progress well with therapy and be appropriate to discharge home w/ assist of wife and no further skilled therapy needs (pending progress).       Patient will benefit from skilled intervention to address the above impairments. Patients rehabilitation potential is considered to be Good  Factors which may influence rehabilitation potential include:   [ ]         None noted  [ ]         Mental ability/status  [ ]         Medical condition  [ ]         Home/family situation and support systems  [ ]         Safety awareness  [X]         Pain tolerance/management  [ ]         Other:        PLAN :  Recommendations and Planned Interventions:  [X]           Bed Mobility Training             [ ]    Neuromuscular Re-Education  [X]           Transfer Training                   [ ]    Orthotic/Prosthetic Training  [X]           Gait Training                         [ ]    Modalities  [X]           Therapeutic Exercises           [ ]    Edema Management/Control  [X]           Therapeutic Activities            [X]    Patient and Family Training/Education  [X]           Other (comment): stair climbing     Frequency/Duration: Patient will be followed by physical therapy  4 times a week to address goals. Discharge Recommendations: HH PT vs None   Further Equipment Recommendations for Discharge: None       SUBJECTIVE:   Patient stated This ain't my first lauren golden.       OBJECTIVE DATA SUMMARY:   HISTORY:    Past Medical History:   Diagnosis Date    [facility]       aortic aneurysm     Arthritis      CAD (coronary artery disease)      Hypertension      Ill-defined condition 01/22/2008     PVD    [facility] 2005     Past Surgical History:   Procedure Laterality Date    CARDIAC SURG PROCEDURE UNLIST   03/25/2008     stent- prox RCA     HX APPENDECTOMY        HX ORTHOPAEDIC         right ankle broken- screw and pins     HX ROTATOR CUFF REPAIR         left shoulder     HX VASCULAR ACCESS         bypass done x3      Prior Level of Function/Home Situation: Pt reports independence w/ ambulation and ADLs. Denies history of falls. Still driving. Works full time [employer]. \" Performs yard work. Lives with wife. Personal factors and/or comorbidities impacting plan of care:      Home Situation  Home Environment: Private residence  # Steps to Enter: 2  Rails to Enter: Yes  One/Two Story Residence: One story  Living Alone: No  Support Systems: Spouse/Significant Other/Partner  Patient Expects to be Discharged to[de-identified] Private residence  Current DME Used/Available at Home: None     EXAMINATION/PRESENTATION/DECISION MAKING:   Critical Behavior:  Neurologic State: Alert, Eyes open spontaneously, Appropriate for age  Orientation Level: Oriented X4  Cognition: Follows commands, Appropriate decision making, Appropriate for age attention/concentration, Appropriate safety awareness     Hearing:   Auditory  Auditory Impairment: Hard of hearing, bilateral  Hearing Aids/Status: Does not own  Skin:  Dressing intact  Edema: None noted   Range Of Motion:  AROM: Generally decreased, functional                       Strength:    Strength: Generally decreased, functional                    Tone & Sensation:   Tone: Normal              Sensation: Intact               Coordination:  Coordination: Within functional limits  Vision:      Functional Mobility:  Bed Mobility:  Rolling: Other (comment) (seated in bedside chair upon arrival )           Transfers:  Sit to Stand: Supervision  Stand to Sit: Supervision                       Balance:   Sitting: Intact  Standing: Impaired  Standing - Static: Good  Standing - Dynamic : Fair  Ambulation/Gait Training:  Distance (ft): 220 Feet (ft)  Assistive Device: Gait belt  Ambulation - Level of Assistance: Contact guard assistance        Gait Abnormalities: Decreased step clearance        Base of Support: Widened     Speed/Sonia: Pace decreased (<100 feet/min)  Step Length: Left shortened;Right shortened                                  Functional Measure:  Barthel Index:      Bathin  Bladder: 10  Bowels: 10  Groomin  Dressin  Feeding: 10  Mobility: 10  Stairs: 0  Toilet Use: 10  Transfer (Bed to Chair and Back): 10  Total: 75         Barthel and G-code impairment scale:  Percentage of impairment CH  0% CI  1-19% CJ  20-39% CK  40-59% CL  60-79% CM  80-99% CN  100%   Barthel Score 0-100 100 99-80 79-60 59-40 20-39 1-19    0   Barthel Score 0-20 20 17-19 13-16 9-12 5-8 1-4 0      The Barthel ADL Index: Guidelines  1. The index should be used as a record of what a patient does, not as a record of what a patient could do. 2. The main aim is to establish degree of independence from any help, physical or verbal, however minor and for whatever reason. 3. The need for supervision renders the patient not independent. 4. A patient's performance should be established using the best available evidence. Asking the patient, friends/relatives and nurses are the usual sources, but direct observation and common sense are also important. However direct testing is not needed. 5. Usually the patient's performance over the preceding 24-48 hours is important, but occasionally longer periods will be relevant. 6. Middle categories imply that the patient supplies over 50 per cent of the effort. 7. Use of aids to be independent is allowed. Davon Davis., Barthel, D.W. (0756). Functional evaluation: the Barthel Index. [address] [facility] (14)2. FLAVIA Kenny, Isabella Trujillo., Addy Stone.[facility], [address] [place] [address] (1999). Measuring the change indisability after inpatient rehabilitation; comparison of the responsiveness of the Barthel Index and Functional Ramsey Measure. Journal of Neurology, Neurosurgery, and Psychiatry, 66(4), 831-449. Maria G Durant, N.J.A, PARISH Kimble, & Kevin Dlol, M.A. (2004.) Assessment of post-stroke quality of life in cost-effectiveness studies: The usefulness of the Barthel Index and the EuroQoL-5D. Quality of Life Research, 13, 392-77         G codes:   In compliance with CMSs Claims Based Outcome Reporting, the following G-code set was chosen for this patient based on their primary functional limitation being treated: The outcome measure chosen to determine the severity of the functional limitation was the Barthel Index with a score of 75/100 which was correlated with the impairment scale. · Mobility - Walking and Moving Around:               - CURRENT STATUS:    CJ - 20%-39% impaired, limited or restricted               - GOAL STATUS:           CI - 1%-19% impaired, limited or restricted               - D/C STATUS:                       ---------------To be determined---------------      Physical Therapy Evaluation Charge Determination   History Examination Presentation Decision-Making   MEDIUM  Complexity : 1-2 comorbidities / personal factors will impact the outcome/ POC  MEDIUM Complexity : 3 Standardized tests and measures addressing body structure, function, activity limitation and / or participation in recreation  MEDIUM Complexity : Evolving with changing characteristics  MEDIUM Complexity : FOTO score of 26-74      Based on the above components, the patient evaluation is determined to be of the following complexity level: MEDIUM     Pain:  Pain Scale 1: Numeric (0 - 10)  Pain Intensity 1: 0  Pain Location 1: Groin  Pain Orientation 1: Right  Pain Description 1: Aching  Pain Intervention(s) 1: Medication (see MAR); Repositioned;Rest;Other (comment) (Patient just medicated.)  Activity Tolerance:   VSS throughout on RA   Please refer to the flowsheet for vital signs taken during this treatment.   After treatment:   [X]         Patient left in no apparent distress sitting up in chair  [ ]         Patient left in no apparent distress in bed  [X]         Call bell left within reach  [X]         Nursing notified  [ ]         Caregiver present  [ ]         Bed alarm activated      COMMUNICATION/EDUCATION:   The patients plan of care was discussed with: Occupational Therapist and Registered Nurse.  [X]         Fall prevention education was provided and the patient/caregiver indicated understanding. [X]         Patient/family have participated as able in goal setting and plan of care. [X]         Patient/family agree to work toward stated goals and plan of care. [ ]         Patient understands intent and goals of therapy, but is neutral about his/her participation. [ ]         Patient is unable to participate in goal setting and plan of care.      Thank you for this referral.  Justin Hernadez, PT, DPT   Time Calculation: 19 mins

## 2017-09-30 NOTE — PROGRESS NOTES
Problem: Falls - Risk of  Goal: *Absence of Falls  Document Jose Fall Risk and appropriate interventions in the flowsheet.    Outcome: Progressing Towards Goal  Fall Risk Interventions:  Mobility Interventions: Communicate number of staff needed for ambulation/transfer, OT consult for ADLs, Patient to call before getting OOB, PT Consult for mobility concerns     Mentation Interventions: Adequate sleep, hydration, pain control, Door open when patient unattended, Evaluate medications/consider consulting pharmacy, Eyeglasses and hearing aids, Familiar objects from home, Increase mobility, More frequent rounding, Reorient patient, Room close to nurse's station, Toileting rounds, Update white board     Medication Interventions: Evaluate medications/consider consulting pharmacy, Patient to call before getting OOB     Elimination Interventions: Call light in reach, Patient to call for help with toileting needs, Toilet paper/wipes in reach, Toileting schedule/hourly rounds

## 2017-09-30 NOTE — PROGRESS NOTES
Occupational Therapy Goals  Initiated 9/30/2017  1. Patient will perform grooming standing at sink with independence within 7 day(s). 2.  Patient will perform upper body dressing with modified independence within 7 day(s). 3.  Patient will perform lower body dressing with modified independence within 7 day(s). 4.  Patient will perform toilet transfers with independence within 7 day(s). 5.  Patient will perform all aspects of toileting with independence within 7 day(s). 6.  Patient will perform lower body sponge bathing with modified independence within 7 day(s). Occupational Therapy EVALUATION  Patient: César Shell (46 y.o. male)  Date: 9/30/2017  Primary Diagnosis: AAA  AAA (abdominal aortic aneurysm) without rupture (HCC)  Procedure(s) (LRB):  ENDOVASCULAR AORTIC ABDOMINAL ANEURYSM REPAIR(EVAR)  (N/A) 1 Day Post-Op   Precautions: none       ASSESSMENT :  Based on the objective data described below, the patient presents with significant surgical site pain s/p CVA, GW, decreased activity tolerance, decreased safety awareness and midly decreased balance which is impairing his functional independence. He is functioning below his independent baseline, now performing ADLs at an independent to mod A level, and is supervision to [place] [id number] for functional mobility. Patient will benefit from skilled intervention to address the above impairments, but no further OT needs after discharge are anticipated.    Patients rehabilitation potential is considered to be Excellent  Factors which may influence rehabilitation potential include:   []             None noted  []             Mental ability/status  []             Medical condition  []             Home/family situation and support systems  []             Safety awareness  [x]             Pain tolerance/management  []             Other:      PLAN :  Recommendations and Planned Interventions:  [x]               Self Care Training                  []        Therapeutic Activities  [x]               Functional Mobility Training    []        Cognitive Retraining  []               Therapeutic Exercises           [x]        Endurance Activities  [x]               Balance Training                   []        Neuromuscular Re-Education  []               Visual/Perceptual Training     [x]   Home Safety Training  [x]               Patient Education                 [x]        Family Training/Education  []               Other (comment):    Frequency/Duration: Patient will be followed by occupational therapy 3 times a week to address goals. Discharge Recommendations: None  Further Equipment Recommendations for Discharge: none       OBJECTIVE DATA SUMMARY:     Past Medical History:   Diagnosis Date    [facility]     aortic aneurysm     Arthritis     CAD (coronary artery disease)     Hypertension     Ill-defined condition 01/22/2008    PVD    Thromboembolus ([address] 2005     Past Surgical History:   Procedure Laterality Date    CARDIAC SURG PROCEDURE UNLIST  03/25/2008    stent- prox RCA     HX APPENDECTOMY      HX ORTHOPAEDIC      right ankle broken- screw and pins     HX ROTATOR CUFF REPAIR      left shoulder     HX VASCULAR ACCESS      bypass done x3      Prior Level of Function/Home Situation: Independent and working. Expanded or extensive additional review of patient history:     Home Situation  Home Environment: Private residence  # Steps to Enter: 2  Rails to Enter: Yes  One/Two Story Residence: One story  Living Alone: No  Support Systems: Spouse/Significant Other/Partner  Patient Expects to be Discharged to[de-identified] Private residence  Current DME Used/Available at Home: None  [x]  Right hand dominant   []  Left hand dominant  Cognitive/Behavioral Status:  Neurologic State: Alert;Eyes open spontaneously; Appropriate for age  Orientation Level: Oriented X4  Cognition: Follows commands; Appropriate decision making; Appropriate for age attention/concentration; Appropriate safety awareness    Vision/Perceptual:     WDL   Range of Motion:  AROM: Generally decreased, functional     Strength:  Strength: Generally decreased, functional  Coordination:  Coordination: Within functional limits          Tone & Sensation:  Tone: Normal  Balance:  Sitting: Intact  Standing: Impaired  Standing - Static: Good  Standing - Dynamic : Fair  Functional Mobility and Transfers for ADLs:  Bed Mobility:  Rolling: Other (comment) (seated in bedside chair upon arrival )           Transfers:  Sit to Stand: Supervision     Bed to Chair: Contact guard assistance          Toilet Transfer : Supervision              ADL Assessment:  Feeding: Independent    Oral Facial Hygiene/Grooming: Supervision    Bathing: Moderate assistance    Upper Body Dressing: Supervision;Setup    Lower Body Dressing: Moderate assistance    Toileting: Supervision                Functional Measure:  Barthel Index:    Bathin  Bladder: 10  Bowels: 10  Groomin  Dressin  Feeding: 10  Mobility: 10  Stairs: 0  Toilet Use: 10  Transfer (Bed to Chair and Back): 10  Total: 75       Barthel and G-code impairment scale:  Percentage of impairment CH  0% CI  1-19% CJ  20-39% CK  40-59% CL  60-79% CM  80-99% CN  100%   Barthel Score 0-100 100 99-80 79-60 59-40 20-39 1-19   0   Barthel Score 0-20 20 17-19 13-16 9-12 5-8 1-4 0      The Barthel ADL Index: Guidelines  1. The index should be used as a record of what a patient does, not as a record of what a patient could do. 2. The main aim is to establish degree of independence from any help, physical or verbal, however minor and for whatever reason. 3. The need for supervision renders the patient not independent. 4. A patient's performance should be established using the best available evidence. Asking the patient, friends/relatives and nurses are the usual sources, but direct observation and common sense are also important. However direct testing is not needed.   5. Usually the patient's performance over the preceding 24-48 hours is important, but occasionally longer periods will be relevant. 6. Middle categories imply that the patient supplies over 50 per cent of the effort. 7. Use of aids to be independent is allowed. Marcos Pierre., Barthel, D.W. (5082). Functional evaluation: the Barthel Index. [facility] (14)2. FLAVIA Chan, Chikis Medina., Toan Baez., Fausto Lau, [address] [place] [address] Scott (1999). Measuring the change indisability after inpatient rehabilitation; comparison of the responsiveness of the Barthel Index and Functional Turner Measure. Journal of Neurology, Neurosurgery, and Psychiatry, 66(4), 702-800. Keanu Powers, N.J.A, PARISH Kimble, & Sam Henderson MATIF. (2004.) Assessment of post-stroke quality of life in cost-effectiveness studies: The usefulness of the Barthel Index and the EuroQoL-5D. Quality of Life Research, 13, 427-43       In compliance with CMSs Claims Based Outcome Reporting, the following G-code set was chosen for this patient based on their primary functional limitation being treated: The outcome measure chosen to determine the severity of the functional limitation was the Barthel Index with a score of 75/100 which was correlated with the impairment scale. ? Self Care:     - CURRENT STATUS: CJ - 20%-39% impaired, limited or restricted    - GOAL STATUS:  CH - 0% impaired, limited or restricted    - D/C STATUS:  ---------------To be determined---------------       Pain:  Pain Scale 1: Numeric (0 - 10)  Pain Intensity 1: 0  Pain Location 1: Groin  Pain Orientation 1: Right  Pain Description 1: Aching  Pain Intervention(s) 1: Medication (see MAR); Repositioned;Rest;Other (comment) (Patient just medicated.)  Activity Tolerance:   VSS    After treatment:   [x] Patient left in no apparent distress sitting up in chair  [] Patient left in no apparent distress in bed  [x] Call bell left within reach  [x] Nursing notified  [] Caregiver present  [] Bed alarm activated    COMMUNICATION/EDUCATION:   The patients plan of care was discussed with: Physical Therapist.  [x] Home safety education was provided and the patient/caregiver indicated understanding. [x] Patient/family have participated as able in goal setting and plan of care. [x] Patient/family agree to work toward stated goals and plan of care. [] Patient understands intent and goals of therapy, but is neutral about his/her participation. [] Patient is unable to participate in goal setting and plan of care. This patients plan of care is appropriate for delegation to [facility].     Thank you for this referral.  Damir Brewer, OTR/L  Time Calculation: 18 mins

## 2017-10-01 VITALS
WEIGHT: 177.25 LBS | TEMPERATURE: 98 F | HEART RATE: 72 BPM | RESPIRATION RATE: 17 BRPM | SYSTOLIC BLOOD PRESSURE: 137 MMHG | HEIGHT: 72 IN | BODY MASS INDEX: 24.01 KG/M2 | DIASTOLIC BLOOD PRESSURE: 62 MMHG | OXYGEN SATURATION: 98 %

## 2017-10-01 LAB
ANION GAP SERPL CALC-SCNC: 6 MMOL/L (ref 5–15)
BUN SERPL-MCNC: 20 MG/DL (ref 6–20)
BUN/CREAT SERPL: 12 (ref 12–20)
CALCIUM SERPL-MCNC: 8.3 MG/DL (ref 8.5–10.1)
CHLORIDE SERPL-SCNC: 103 MMOL/L (ref 97–108)
CO2 SERPL-SCNC: 27 MMOL/L (ref 21–32)
CREAT SERPL-MCNC: 1.71 MG/DL (ref 0.7–1.3)
GLUCOSE SERPL-MCNC: 120 MG/DL (ref 65–100)
POTASSIUM SERPL-SCNC: 4.3 MMOL/L (ref 3.5–5.1)
SODIUM SERPL-SCNC: 136 MMOL/L (ref 136–145)

## 2017-10-01 PROCEDURE — 36415 COLL VENOUS BLD VENIPUNCTURE: CPT | Performed by: SURGERY

## 2017-10-01 PROCEDURE — 74011250636 HC RX REV CODE- 250/636: Performed by: SURGERY

## 2017-10-01 PROCEDURE — 80048 BASIC METABOLIC PNL TOTAL CA: CPT | Performed by: SURGERY

## 2017-10-01 PROCEDURE — 74011250637 HC RX REV CODE- 250/637: Performed by: SURGERY

## 2017-10-01 RX ORDER — MORPHINE SULFATE 2 MG/ML
1 INJECTION, SOLUTION INTRAMUSCULAR; INTRAVENOUS
Status: DISCONTINUED | OUTPATIENT
Start: 2017-10-01 | End: 2017-10-01 | Stop reason: HOSPADM

## 2017-10-01 RX ORDER — OXYCODONE AND ACETAMINOPHEN 5; 325 MG/1; MG/1
1-2 TABLET ORAL
Qty: 40 TAB | Refills: 0 | Status: SHIPPED | OUTPATIENT
Start: 2017-10-01

## 2017-10-01 RX ORDER — TAMSULOSIN HYDROCHLORIDE 0.4 MG/1
0.4 CAPSULE ORAL DAILY
Qty: 30 CAP | Refills: 0 | Status: SHIPPED | OUTPATIENT
Start: 2017-10-01

## 2017-10-01 RX ADMIN — CLOPIDOGREL BISULFATE 75 MG: 75 TABLET ORAL at 08:45

## 2017-10-01 RX ADMIN — ONDANSETRON 4 MG: 2 INJECTION INTRAMUSCULAR; INTRAVENOUS at 06:01

## 2017-10-01 RX ADMIN — LISINOPRIL 20 MG: 20 TABLET ORAL at 08:45

## 2017-10-01 RX ADMIN — ASPIRIN 81 MG 81 MG: 81 TABLET ORAL at 08:45

## 2017-10-01 RX ADMIN — OXYCODONE AND ACETAMINOPHEN 1 TABLET: 5; 325 TABLET ORAL at 02:35

## 2017-10-01 NOTE — PROGRESS NOTES
Problem: Falls - Risk of  Goal: *Absence of Falls  Document Jose Fall Risk and appropriate interventions in the flowsheet.    Outcome: Resolved/Met Date Met:  10/01/17  Fall Risk Interventions:  Mobility Interventions: Assess mobility with egress test, Bed/chair exit alarm     Mentation Interventions: Adequate sleep, hydration, pain control, Bed/chair exit alarm, Door open when patient unattended     Medication Interventions: Assess postural VS orthostatic hypotension, Bed/chair exit alarm     Elimination Interventions: Bed/chair exit alarm, Call light in reach

## 2017-10-01 NOTE — PROGRESS NOTES
Vascular Surgery  --feels much better  Patient Vitals for the past 12 hrs:   Temp Pulse Resp BP SpO2   10/01/17 0800 98.5 °F (36.9 °C) 73 19 104/62 96 %   10/01/17 0701 - 77 15 97/52 -   10/01/17 0700 - 72 18 (!) 86/52 -   10/01/17 0637 98.4 °F (36.9 °C) - - - -   10/01/17 0600 - 74 15 104/56 -   10/01/17 0559 - 75 17 - 95 %   10/01/17 0500 - 84 18 128/66 -   10/01/17 0400 - 75 13 124/64 93 %   10/01/17 0355 98.9 °F (37.2 °C) - - - -   10/01/17 0354 99.2 °F (37.3 °C) - - - -   10/01/17 0300 - 84 20 126/61 92 %   10/01/17 0200 99.6 °F (37.6 °C) 82 15 140/65 93 %   10/01/17 0100 - 77 19 155/66 94 %   10/01/17 0001 - 77 18 138/66 93 %   10/01/17 0000 - 77 16 - 93 %   09/30/17 2307 100.3 °F (37.9 °C) - - - -   09/30/17 2300 - 78 19 140/63 96 %   09/30/17 2200 - 76 15 142/57 95 %   09/30/17 2100 - 77 18 137/60 92 %     Feet are warm  Abdomen is distended but apparently at baseline    Recent Results (from the past 12 hour(s))   METABOLIC PANEL, BASIC    Collection Time: 10/01/17  4:15 AM   Result Value Ref Range    Sodium 136 136 - 145 mmol/L    Potassium 4.3 3.5 - 5.1 mmol/L    Chloride 103 97 - 108 mmol/L    CO2 27 21 - 32 mmol/L    Anion gap 6 5 - 15 mmol/L    Glucose 120 (H) 65 - 100 mg/dL    BUN 20 6 - 20 MG/DL    Creatinine 1.71 (H) 0.70 - 1.30 MG/DL    BUN/Creatinine ratio 12 12 - 20      GFR est AA 48 (L) >60 ml/min/1.73m2    GFR est non-AA 39 (L) >60 ml/min/1.73m2    Calcium 8.3 (L) 8.5 - 10.1 MG/DL     D/C to home today; if unable to urinate will have him go home with a leg bag.

## 2017-10-01 NOTE — PROGRESS NOTES
0710 Report received from [facility]. 0800 Assessment completed. Patient alert and oriented x4. Patient denies pain and nausea at present. Respirations nonlabored on RA at rest. Bilateral groin dressings intact with no drainage or hematoma noted. Patient ambulating with assist x1 to use bathroom toilet, patient ambulates well with no difficulties noted. Edmond catheter intact draining carli colored urine. All needs met. 0830 Dr. Elizabeth Pinedo in room to see and assess patient. 0172 Edmond catheter removed per doctors order, patient tolerated procedure well. 3710 Asked patient if he regularly received influenza vaccine and he stated \" I never get the influenza vaccine and I do not want it when I leave the hospital today\". Patient refusing influenza vaccine, patient made aware of risks and benefits to receiving the vaccine, patient verbalized understanding. 0945 Patient voided 100ML of carli hazy colored urine in the urinal.    1115 Bilateral groin dressings changed per doctors order. 1130 Patient voided another 100ML of carli hazy colored urine in the urinal.     1200 Patient voided another 150ML of carli hazy colored urine. 1215 Patient in stable condition,  patient denies pain and nausea. Discharge instructions given to patient and wife, both verbalized understanding. Patient discharged to home with belongings at side with wife and daughter.

## 2017-10-01 NOTE — DISCHARGE INSTRUCTIONS
Can shower; can remove dressings and change with band-aid as needed. Endovascular Aortic Aneurysm Repair: What to Expect at Home  Your Recovery  Endovascular aortic aneurysm repair is a procedure to fix a weak and bulging section of the aorta. The aorta is the large blood vessel (artery) that carries blood from the heart through the belly to the rest of the body. The doctor put a man-made tube called a graft inside the aneurysm. Blood will pass through the graft in the aorta without pushing on the aneurysm. You can expect the cuts (incisions) in your groin to be sore for 1 to 2 weeks. If you have stitches or staples in your incisions, the doctor may need to take them out. You may feel more tired than usual for 1 to 2 weeks after surgery. You may be able to do many of your usual activities after 1 to 2 weeks. But you will probably need up to 4 weeks to fully recover. Strenuous activities will not hurt the graft in your aorta, but they may cause problems with the incisions in your groin. You can be more active when your groin is no longer sore. This care sheet gives you a general idea about how long it will take for you to recover. But each person recovers at a different pace. Follow the steps below to get better as quickly as possible. How can you care for yourself at home? Activity  · Rest when you feel tired. Getting enough sleep will help you recover. · Try to walk each day. Start by walking a little more than you did the day before. Bit by bit, increase the amount you walk. Walking boosts blood flow and helps prevent pneumonia and constipation. · Avoid strenuous activities, such as bicycle riding, jogging, weight lifting, or aerobic exercise. Your doctor will tell you when it's okay to do strenuous activity. · Ask your doctor when you can drive again. · You will probably need to take at least 1 to 2 weeks off from work. It depends on the type of work you do and how you feel.   · You may shower as usual. Joe Serna the incisions dry. Do not take a bath for the first 2 weeks, or until your doctor tells you it is okay. Diet  · You can eat your normal diet. If your stomach is upset, try bland, low-fat foods like plain rice, broiled chicken, toast, and yogurt. · Drink plenty of fluids (unless your doctor tells you not to). · You may notice that your bowel movements are not regular right after your surgery. This is common. Try to avoid constipation and straining with bowel movements. You may want to take a fiber supplement every day. If you have not had a bowel movement after a couple of days, ask your doctor about taking a mild laxative. Medicines  · Your doctor will tell you if and when you can restart your medicines. He or she will also give you instructions about taking any new medicines. · If you take blood thinners, such as warfarin (Coumadin), clopidogrel (Plavix), or aspirin, be sure to talk to your doctor. He or she will tell you if and when to start taking those medicines again. Make sure that you understand exactly what your doctor wants you to do. · Be safe with medicines. Take pain medicines exactly as directed. ¨ If the doctor gave you a prescription medicine for pain, take it as prescribed. ¨ If you are not taking a prescription pain medicine, ask your doctor if you can take an over-the-counter medicine. ¨ Do not take two or more pain medicines at the same time unless the doctor told you to. Many pain medicines have acetaminophen, which is Tylenol. Too much acetaminophen (Tylenol) can be harmful. · If you think your pain medicine is making you sick to your stomach:  ¨ Take your medicine after meals (unless your doctor has told you not to). ¨ Ask your doctor for a different pain medicine. · If your doctor prescribed antibiotics, take them as directed. Do not stop taking them just because you feel better. You need to take the full course of antibiotics.   Incision care  · If you have strips of tape on the incisions, leave the tape on for a week or until it falls off. · Wash the area daily with water and pat it dry. Other cleaning products, such as hydrogen peroxide, can make the wounds heal more slowly. You may cover the area with a gauze bandage if it weeps or rubs against clothing. Change the bandage every day. · Keep the area clean and dry. Follow-up care is a key part of your treatment and safety. Be sure to make and go to all appointments, and call your doctor if you are having problems. It's also a good idea to know your test results and keep a list of the medicines you take. When should you call for help? Call 911 anytime you think you may need emergency care. For example, call if:  · You passed out (lost consciousness). · You have severe trouble breathing. · You have sudden chest pain and shortness of breath, or you cough up blood or foamy, pink mucus. · You have a lump that is getting bigger under your skin where the incisions were made in your groin. · You have severe pain in your belly. · You have chest pain or pressure. This may occur with:  ¨ Sweating. ¨ Shortness of breath. ¨ Nausea or vomiting. ¨ Pain that spreads from the chest to the neck, jaw, or one or both shoulders or arms. ¨ Dizziness or lightheadedness. ¨ A fast or uneven pulse. After calling 911, chew 1 adult-strength or 2 to 4 low-dose aspirin. Wait for an ambulance. Do not try to drive yourself. Call your doctor now or seek immediate medical care if:  · You have new or increased shortness of breath. · You are dizzy or lightheaded, or you feel like you may faint. · You are sick to your stomach or cannot keep fluids down. · You have pain that does not get better after you take pain medicine. · You have a fever over 100°F.  · You have loose stitches, or one of your incisions comes open. · Bright red blood has soaked through the bandage over your incisions.   · You have signs of infection, such as:  ¨ Increased pain, swelling, warmth, or redness. ¨ Red streaks leading from the incisions. ¨ Pus draining from the incisions. ¨ Swollen lymph nodes in your neck, armpits, or groin. ¨ A fever. · You have signs of a blood clot, such as:  ¨ Pain in your calf, back of the knee, thigh, or groin. ¨ Redness and swelling in your leg or groin. Watch closely for any changes in your health, and be sure to contact your doctor if:  · You have sudden weight gain, such as 3 pounds or more in 2 to 3 days. · You have increased swelling in your legs, ankles, or feet. Where can you learn more? Go to http://rakesh-anthony.info/. Enter [phone] [id number] in the search box to learn more about \"Endovascular Aortic Aneurysm Repair: What to Expect at Home. \"  Current as of: March 20, 2017  Content Version: 11.3  © 1303-1994 [employer]. Care instructions adapted under license by [employer] (which disclaims liability or warranty for this information). If you have questions about a medical condition or this instruction, always ask your healthcare professional. [place] [address] any warranty or liability for your use of this information.

## 2017-10-01 NOTE — PROGRESS NOTES
Problem: Falls - Risk of  Goal: *Absence of Falls  Document Jose Fall Risk and appropriate interventions in the flowsheet.    Outcome: Progressing Towards Goal  Fall Risk Interventions:  Mobility Interventions: Communicate number of staff needed for ambulation/transfer, Patient to call before getting OOB, PT Consult for mobility concerns     Mentation Interventions: Adequate sleep, hydration, pain control, Door open when patient unattended, Evaluate medications/consider consulting pharmacy, Eyeglasses and hearing aids, Familiar objects from home, More frequent rounding, Reorient patient, Room close to nurse's station, Toileting rounds, Update white board     Medication Interventions: Evaluate medications/consider consulting pharmacy, Patient to call before getting OOB, Teach patient to arise slowly     Elimination Interventions: Call light in reach, Patient to call for help with toileting needs, Toilet paper/wipes in reach, Toileting schedule/hourly rounds

## 2017-10-01 NOTE — PROGRESS NOTES
7059   Patient received last evening at 1900 hours from [facility]; alert, oriented, moving all extremities; speech clear; on room air; patient in sinus rhythm; one peripheral IV site infusing NS at 50 cc/hr; patient has had some nausea, no emesis; patient has taken a little water po so far on this shift; zofran given around 2315 hours on 9/30/17; abd slightly distended, semi-soft, hypoactive bowel sounds; patient has been resting fairly well; given percocet 2 tabs around 2315 hours on 9/30/17; blount catheter draining dark carli hazy color urine; patient's temp at 2000 hours was 99.3 oral and 100.3 oral at 0000 hours; dressing right and left groin dry and intact; pedal and PT pulses by doppler; no family at bedside tonight.    0650   Patient rested well tonight; given zofran 4 mg IV X 2 for some nausea, no emesis; given percocet 2 tabs once and percocet 1 tab once for pain; rating pain level at a 1 at this time; patient awake this AM and up in chair now. Lab done. Blount catheter drained 605 cc carli hazy color urine this shift. Systolic  - 114 this shift; up in chair at 9615 hours and systolic BP now 97.    3101    Report given to [facility] [employer].

## 2017-10-17 NOTE — DISCHARGE SUMMARY
Physician Discharge Summary     Patient ID:  Skip Cross  [id number]  62 y.o.  1945    Admit Date: 9/29/2017    Discharge Date: 10/1/2017    Admission Diagnoses: AAA  AAA (abdominal aortic aneurysm) without rupture [facility])    Discharge Diagnoses: Active Problems:    AAA (abdominal aortic aneurysm) without rupture ([address] (9/29/2017)         Last Procedure: Procedure(s):  ENDOVASCULAR AORTIC ABDOMINAL ANEURYSM REPAIR(EVAR)       Hospital Course:   Normal hospital course for this procedure. Patient Instructions:   Cannot display discharge medications since this patient is not currently admitted.       Follow-up with Mary Kate Kent MD      Signed:  Mary Kate Kent MD  10/17/2017  4:52 AM

## 2017-10-17 NOTE — OP NOTES
[address], [address]   OP NOTE       Name:  Malathi Srivastava   MR#:  [id number]   :  1945   Account #:  [de-identified]    Surgery Date:  2017   Date of Adm:  2017       DATE OF PROCEDURE: 2017    PREOPERATIVE DIAGNOSIS: Abdominal aortic aneurysm. POSTOPERATIVE DIAGNOSIS: Abdominal aortic aneurysm. PROCEDURES PERFORMED:   1. Endovascular repair of abdominal aortic aneurysm with bifurcated   device M1 iliac docking limb. 2. Placement of additional iliac extension limb. 3. Bilateral femoral artery exposure. 4. Abdominal aortogram with bilateral catheter in aorta. 5. Intravascular ultrasound of bilateral iliac arteries and abdominal   aorta. SURGEON: Dr. Lillian Newton. ANESTHESIA: General.    ESTIMATED BLOOD LOSS: 100 mL. SPECIMENS REMOVED: None. DRAINS: None. COMPLICATIONS: None. INDICATIONS: The patient is a 66-year-old male with a history of   peripheral arterial disease and a failed left-to-right femoral-femoral   bypass. Subsequent to that, he had a bypass from the right iliac   bifurcation into the right profunda. The patient now has a 5.5 cm   abdominal aortic aneurysm and presents for endovascular repair. PROCEDURE: After informed consent was obtained, the patient was   given intravenous antibiotics within 1 hour of the incision. He was   taken to the operating room and after induction of adequate general   anesthesia, the abdomen and both groins were prepped and draped as   a sterile field. A longitudinal incision was made in the proximal right   thigh and dissection was carried down to the [place]-Rogelio bypass and this   was dissected free and encircled with vessel loops. An incision was   made in the left groin and dissection was carried down through the   scar and the common femoral artery was isolated in the area of the   anastomosis of the old femoral crossover graft.  There was a good   pulse in the common femoral artery and an area of the artery just   proximal to the anastomosis of the femoral graft was isolated and was   a good soft spot for puncture. The patient was systemically   heparinized. The left common femoral artery was punctured on the   anterolateral surface just proximal to the old anastomosis of the   femoral graft and an 8-Greek sheath was placed. On the right side,   the [place]-Rogelio graft was punctured, and an 8-Greek sheath was   placed. The catheters and wires were advanced up into the aorta from   the bilateral femoral approaches. From the left femoral approach, a   marker catheter was placed at the level of the renal arteries. From the   right femoral approach, a Glidewire was advanced up into the thoracic   aorta and then exchanged for a Lunderquist wire. An abdominal   aortogram was performed and the lowest renal artery was identified. A   Medtronic Edurant IIS bifurcated endograft (28 x 14 x 103) was then   oriented appropriately and deployed immediately beneath the lowest   renal artery. The contralateral gate was then cannulated from the left   femoral approach and a marker catheter was spun within the main   body of the device to confirm appropriate location. A retrograde left   iliac arteriogram was performed and the location of the left internal iliac   artery was identified. A 16 x 13 x 124 left iliac extension limb was then   deployed extending from the main device down to the distal left   common iliac artery. The remainder of the right iliac limb of the main   device was then deployed and a retrograde right iliac angiogram was   performed. A 16 x 13 x 93 right iliac limb was then deployed extending   from the main device down to the distal right common iliac artery. The   proximal and distal seal zones and all areas of the device overlap were   then angioplastied with a Reliant molding balloon.  Next, give separate   Aptus EndoANCHOR were placed at the proximal seal in the infrarenal   aorta. The anchors were deployed under fluoroscopic guidance to   secure the main body of the endograft to the normal infrarenal aorta. This was done because there was some area of eccentric thrombus in   the proximal neck immediately beneath the right renal artery, but the   patient was a poor candidate for open repair. Anchors were placed at   the 1030, 9 o'clock, 730, 430, and 130 positions obviously   concentrating 3 of the anchors on the left lateral aspect of the seal in   the area where there was suboptimal aortic neck. Completion aortoiliac   angiogram was done, which showed good position of the device   relative to the renal arteries and the internal iliac arteries with good   flow through the device and no evidence of stenosis and there was no   evidence of endoleak. Intravascular ultrasound was used to examine   the native iliac arteries and the iliac limbs of the endograft and the   aorta. There was no evidence of limb impingement or native iliac artery   stenosis. There was good apposition of the device to the native aortic   wall. The catheters and wires were removed. The left common femoral   artery puncture site was closed with 5-0 Prolene suture after flushing   maneuvers were performed. The small graftotomy in the right femoral   [place]-Rogelio graft was closed with 5-0 Prolene suture after flushing   maneuvers were performed. Both groin wounds were irrigated with   antibiotic irrigation and were hemostatic. Both wounds were closed   with multiple layers of Vicryl suture and staples in the skin. The feet   were inspected and there was no evidence of thromboembolic   complications. Dry dressings were applied. The patient was extubated   and transferred to the PACU in stable condition. All counts were   correct.          Tj Oneal (Mariana Kirk) Prerna Franklin MD       / [facility].   D:  10/17/2017   04:40   T:  10/17/2017   05:39   Job #:  [id number]

## 2023-01-18 NOTE — ANESTHESIA PREPROCEDURE EVALUATION
Anesthetic History   No history of anesthetic complications            Review of Systems / Medical History  Patient summary reviewed, nursing notes reviewed and pertinent labs reviewed    Pulmonary          Smoker         Neuro/Psych   Within defined limits           Cardiovascular  Within defined limits  Hypertension          CAD and PAD    Exercise tolerance: >4 METS     GI/Hepatic/Renal  Within defined limits              Endo/Other  Within defined limits      Arthritis     Other Findings              Physical Exam    Airway  Mallampati: II  TM Distance: 4 - 6 cm  Neck ROM: normal range of motion   Mouth opening: Normal     Cardiovascular  Regular rate and rhythm,  S1 and S2 normal,  no murmur, click, rub, or gallop             Dental    Dentition: Full lower dentures and Full upper dentures     Pulmonary  Breath sounds clear to auscultation               Abdominal  GI exam deferred       Other Findings            Anesthetic Plan    ASA: 3  Anesthesia type: general    Monitoring Plan: BIS and Arterial line      Induction: Intravenous  Anesthetic plan and risks discussed with: Patient English

## (undated) DEVICE — DRAPE FLD WRM W44XL66IN C6L FOR INTRATEMP SYS THERMABASIN

## (undated) DEVICE — SUTURE PROL SZ 5-0 L24IN NONABSORBABLE BLU RB-2 L13IN 1/2 8554H

## (undated) DEVICE — INFECTION CONTROL KIT SYS

## (undated) DEVICE — Device

## (undated) DEVICE — X-RAY SPONGES,16 PLY: Brand: DERMACEA

## (undated) DEVICE — GOWN,SIRUS,NONRNF,SETINSLV,2XL,18/CS: Brand: MEDLINE

## (undated) DEVICE — CATH BLLN STENT GRAF 12FRX46MM -- RELIANT

## (undated) DEVICE — VASCULAR-RICHMOND-LF: Brand: MEDLINE INDUSTRIES, INC.

## (undated) DEVICE — HANDLE LT SNAP ON ULT DURABLE LENS FOR TRUMPF ALC DISPOSABLE

## (undated) DEVICE — SYR LR LCK 1ML GRAD NSAF 30ML --

## (undated) DEVICE — TUBING HI PRSS INJ 48IN 1200PSI FLX BRAID POLYUR CNTRST

## (undated) DEVICE — STAPLER SKIN H3.9MM WIRE DIA0.58MM CRWN 6.9MM 35 STPL ROT

## (undated) DEVICE — CATHETER GUID 16FR L62CM DEFLECTED TIP REACH 22MM NK

## (undated) DEVICE — SHTH INTRO 16F X 28CM --

## (undated) DEVICE — SOLUTION IRRIG 1000ML H2O STRL BLT

## (undated) DEVICE — BLADE ASSEMB CLP HAIR FINE --

## (undated) DEVICE — FILTER CLP DISP FOR 5513E CLIPVAC

## (undated) DEVICE — DRAPE XR C ARM 41X74IN LF --

## (undated) DEVICE — COVER,TABLE,60X90,STERILE: Brand: MEDLINE

## (undated) DEVICE — REM POLYHESIVE ADULT PATIENT RETURN ELECTRODE: Brand: VALLEYLAB

## (undated) DEVICE — 3M™ IOBAN™ 2 ANTIMICROBIAL INCISE DRAPE 6651EZ: Brand: IOBAN™ 2

## (undated) DEVICE — INTRODUCER SHTH 8FR CANN L11CM DIL TIP 35MM BLU TUNGSTEN

## (undated) DEVICE — PINNACLE INTRODUCER SHEATH: Brand: PINNACLE

## (undated) DEVICE — TRAY CATHETER 16FR F INCLUDE LUB URIN M TEMP SENS 2 STATLOK STBL

## (undated) DEVICE — DRAPE PRB US TRNSDCR 6X96IN --

## (undated) DEVICE — (D)PREP SKN CHLRAPRP APPL 26ML -- CONVERT TO ITEM 371833

## (undated) DEVICE — STERILE POLYISOPRENE POWDER-FREE SURGICAL GLOVES: Brand: PROTEXIS

## (undated) DEVICE — DEVON™ KNEE AND BODY STRAP 60" X 3" (1.5 M X 7.6 CM): Brand: DEVON

## (undated) DEVICE — SYR POWER 150ML 8IN FILL TUBE --

## (undated) DEVICE — FOGARTY THRU-LUMEN EMBOLECTOMY CATHETER 5.5F 80CM: Brand: FOGARTY

## (undated) DEVICE — Device: Brand: VISIONS PV .035 DIGITAL IVUS CATHETER